# Patient Record
Sex: FEMALE | Race: WHITE | URBAN - METROPOLITAN AREA
[De-identification: names, ages, dates, MRNs, and addresses within clinical notes are randomized per-mention and may not be internally consistent; named-entity substitution may affect disease eponyms.]

---

## 2017-10-03 ENCOUNTER — TELEPHONE (OUTPATIENT)
Dept: DERMATOLOGY CLINIC | Facility: CLINIC | Age: 62
End: 2017-10-03

## 2017-10-03 RX ORDER — LEVOCETIRIZINE DIHYDROCHLORIDE 5 MG/1
5 TABLET, FILM COATED ORAL EVERY EVENING
Qty: 30 TABLET | Refills: 0 | Status: SHIPPED | OUTPATIENT
Start: 2017-10-03

## 2017-10-03 RX ORDER — PREDNISONE 10 MG/1
TABLET ORAL
Qty: 18 TABLET | Refills: 0 | Status: SHIPPED | OUTPATIENT
Start: 2017-10-03 | End: 2021-07-31

## 2017-10-03 RX ORDER — TRIAMCINOLONE ACETONIDE 5 MG/G
CREAM TOPICAL
Qty: 60 G | Refills: 1 | Status: SHIPPED
Start: 2017-10-03 | End: 2017-10-03

## 2017-10-03 RX ORDER — CLOBETASOL PROPIONATE 0.5 MG/G
1 CREAM TOPICAL 2 TIMES DAILY
Qty: 45 G | Refills: 1 | Status: SHIPPED
Start: 2017-10-03 | End: 2017-10-03

## 2017-10-03 RX ORDER — LEVOCETIRIZINE DIHYDROCHLORIDE 5 MG/1
5 TABLET, FILM COATED ORAL EVERY EVENING
Qty: 30 TABLET | Refills: 0 | Status: SHIPPED
Start: 2017-10-03 | End: 2017-10-03

## 2017-10-03 RX ORDER — PREDNISONE 10 MG/1
TABLET ORAL
Qty: 18 TABLET | Refills: 0 | Status: SHIPPED
Start: 2017-10-03 | End: 2017-10-03

## 2017-10-03 RX ORDER — CLOBETASOL PROPIONATE 0.5 MG/G
1 CREAM TOPICAL 2 TIMES DAILY
Qty: 45 G | Refills: 1 | Status: SHIPPED | OUTPATIENT
Start: 2017-10-03 | End: 2018-10-03

## 2017-10-03 RX ORDER — TRIAMCINOLONE ACETONIDE 5 MG/G
CREAM TOPICAL
Qty: 60 G | Refills: 1 | Status: SHIPPED | OUTPATIENT
Start: 2017-10-03

## 2017-10-03 NOTE — TELEPHONE ENCOUNTER
LOV 11/26/16 (please see below Na's note) pt states this started Duy 10/1/17 - she is noticing \"bumps to random spots on her body\" - face, thighs, chest, legs, - describes them as \"raised, itchy, reddish\".  She has been using benadryl orally with la

## 2017-10-03 NOTE — TELEPHONE ENCOUNTER
rx's sent--tac, clobetasol, prednisone and xyzal.  Tac for face clobetasol to other areas.   Prednisone if cont to spread, xyzal less drowsy generally than benadryl, but still can cause drowsiness so just take at night

## 2017-10-04 ENCOUNTER — TELEPHONE (OUTPATIENT)
Dept: DERMATOLOGY CLINIC | Facility: CLINIC | Age: 62
End: 2017-10-04

## 2017-10-04 NOTE — TELEPHONE ENCOUNTER
Called pt back - our coversation got caught off and I could not get  Through to her back - ended up leaving a msg on identified VM - advised to wash face with soap and water and apply  Vaseline. If she is experiencing any facial/lip/tongue swelling or trouble breathing she should dial 911.

## 2017-10-05 NOTE — TELEPHONE ENCOUNTER
Attempted to call pt- no answer , mail box full [phone] #,   M/l detailed on [phone]#  Pt  Should have prednisone taper, and topical steroids and antihistamine. Not clear which meds she is using at this time.

## 2017-10-07 NOTE — TELEPHONE ENCOUNTER
Unable to leave message on [phone]# (mailbox full), left message to call back on [phone]#. Asked pt to please call office back regarding her urgent telephone call last week. Letter mailed to pt asking that she call the Dermatology Department and give update of her condition and for triage of condition.

## 2017-12-27 ENCOUNTER — OFFICE VISIT (OUTPATIENT)
Dept: DERMATOLOGY CLINIC | Facility: CLINIC | Age: 62
End: 2017-12-27

## 2017-12-27 DIAGNOSIS — D23.60 BENIGN NEOPLASM OF SKIN OF UPPER LIMB, INCLUDING SHOULDER, UNSPECIFIED LATERALITY: ICD-10-CM

## 2017-12-27 DIAGNOSIS — L82.1 SEBORRHEIC KERATOSES: Primary | ICD-10-CM

## 2017-12-27 DIAGNOSIS — D23.30 BENIGN NEOPLASM OF SKIN OF FACE: ICD-10-CM

## 2017-12-27 DIAGNOSIS — L30.9 DERMATITIS: ICD-10-CM

## 2017-12-27 DIAGNOSIS — D23.70 BENIGN NEOPLASM OF SKIN OF LOWER LIMB, INCLUDING HIP, UNSPECIFIED LATERALITY: ICD-10-CM

## 2017-12-27 DIAGNOSIS — D23.4 BENIGN NEOPLASM OF SCALP AND SKIN OF NECK: ICD-10-CM

## 2017-12-27 DIAGNOSIS — D23.5 BENIGN NEOPLASM OF SKIN OF TRUNK, EXCEPT SCROTUM: ICD-10-CM

## 2017-12-27 PROCEDURE — 99212 OFFICE O/P EST SF 10 MIN: CPT | Performed by: DERMATOLOGY

## 2017-12-27 PROCEDURE — 99213 OFFICE O/P EST LOW 20 MIN: CPT | Performed by: DERMATOLOGY

## 2018-01-08 NOTE — PROGRESS NOTES
London Vanessa is a 63year old female. HPI:     CC:  Patient presents with:  Full Skin Exam: LOV 11/26/2016. Pt presenting for yearly full body skin exam. Pt denies personal hx of skin cancer. Pt has a family hx of BCC (mother).         Emma Medeiros mouth. Disp:  Rfl:    Clobetasol Propionate 0.05 % External Cream Apply 1 Application topically 2 (two) times daily. Disp: 45 g Rfl: 1   predniSONE 10 MG Oral Tab Take 3 by mouth once daily for 3 days, then 2 for 3 days, then 1 for 3 days.  Disp: 18 tablet Cancer Father      Cancer-throat   • Dementia Maternal Grandmother      Alzheimer's    • Stroke Maternal Grandfather    • Cataracts Other    • Dementia Maternal Aunt      alzheimer's   • Heart Disease Brother    • Heart Disease Paternal Aunt        There w neoplasm of scalp and skin of neck  Benign neoplasm of skin of trunk, except scrotum  Benign neoplasm of skin of upper limb, including shoulder, unspecified laterality  Benign neoplasm of skin of lower limb, including hip, unspecified laterality  Dermatiti

## 2018-02-01 ENCOUNTER — OFFICE VISIT (OUTPATIENT)
Dept: DERMATOLOGY CLINIC | Facility: CLINIC | Age: 63
End: 2018-02-01

## 2018-02-01 DIAGNOSIS — D23.4 BENIGN NEOPLASM OF SCALP AND SKIN OF NECK: ICD-10-CM

## 2018-02-01 DIAGNOSIS — D23.30 BENIGN NEOPLASM OF SKIN OF FACE: ICD-10-CM

## 2018-02-01 DIAGNOSIS — T14.8XXA HEMATOMA: Primary | ICD-10-CM

## 2018-02-01 PROCEDURE — 99212 OFFICE O/P EST SF 10 MIN: CPT | Performed by: DERMATOLOGY

## 2018-02-01 PROCEDURE — 99213 OFFICE O/P EST LOW 20 MIN: CPT | Performed by: DERMATOLOGY

## 2018-02-01 RX ORDER — EPINEPHRINE 0.3 MG/.3ML
0.3 INJECTION SUBCUTANEOUS ONCE
Qty: 2 EACH | Refills: 1 | Status: SHIPPED | OUTPATIENT
Start: 2018-02-01 | End: 2018-02-01

## 2018-02-12 NOTE — PROGRESS NOTES
Brian Gonzales is a 63year old female.     Patient presents with:  Lesion: LOV 12/27/17 pt was seen for full body check, pt here today s/p fall on ice she fell face forward 01/10/18 her front teeth cut her upper lip and has a lump since wanted Never Smoker                                                              Smokeless tobacco: Never Used                      Alcohol use:  Yes              Comment: Wine, 2 drinks occasionally                  Current Outpatient Prescriptions:  Probiotic Pr LIGATION    Social History  Social History   Marital status:   Spouse name: N/A    Years of education: N/A  Number of children: N/A     Occupational History  None on file     Social History Main Topics   Smoking status: Never Smoker    Smokeless tob no suspicious lesions. Healing traumatic injury upper lip. Well-healed some thickening of scar tissue hematoma underneath.   No other new suspicious lesions reassurance regarding keratoses, nevi milia     ASSESSMENT AND PLAN:     Hematoma  (primary encoun

## 2018-11-21 ENCOUNTER — TELEPHONE (OUTPATIENT)
Dept: DERMATOLOGY CLINIC | Facility: CLINIC | Age: 63
End: 2018-11-21

## 2018-11-21 NOTE — TELEPHONE ENCOUNTER
Called pt back - she is requesting to speak with you directly regarding a spot - did not want appt.  Please see tel number below

## 2018-11-21 NOTE — TELEPHONE ENCOUNTER
[phone] non urgent but she is requesting to speak with dr. Jose Mirza personally to save everyones time, she did not specify other than it's about a spot that popped up and dr. Jose Mirza told her she can leave a message to speak directly to dr. Jose Mirza and I told her if dr. Jose Mirza is too busy a nurse would call back to discuss and try to ans. Any questions she is having.  pls advise

## 2018-11-24 NOTE — TELEPHONE ENCOUNTER
recommendations reviewed--sounds like may have been a sk irritated. More eczematous leisons scattered .   Tac and moisturizer--will schedule appt if not improving

## 2019-02-22 ENCOUNTER — OFFICE VISIT (OUTPATIENT)
Dept: DERMATOLOGY CLINIC | Facility: CLINIC | Age: 64
End: 2019-02-22
Payer: COMMERCIAL

## 2019-02-22 DIAGNOSIS — D23.60 BENIGN NEOPLASM OF SKIN OF UPPER LIMB, INCLUDING SHOULDER, UNSPECIFIED LATERALITY: ICD-10-CM

## 2019-02-22 DIAGNOSIS — D23.30 BENIGN NEOPLASM OF SKIN OF FACE: Primary | ICD-10-CM

## 2019-02-22 DIAGNOSIS — D23.70 BENIGN NEOPLASM OF SKIN OF LOWER LIMB, INCLUDING HIP, UNSPECIFIED LATERALITY: ICD-10-CM

## 2019-02-22 DIAGNOSIS — L82.1 SEBORRHEIC KERATOSES: ICD-10-CM

## 2019-02-22 DIAGNOSIS — D23.5 BENIGN NEOPLASM OF SKIN OF TRUNK, EXCEPT SCROTUM: ICD-10-CM

## 2019-02-22 DIAGNOSIS — D23.4 BENIGN NEOPLASM OF SCALP AND SKIN OF NECK: ICD-10-CM

## 2019-02-22 PROCEDURE — 99212 OFFICE O/P EST SF 10 MIN: CPT | Performed by: DERMATOLOGY

## 2019-02-22 PROCEDURE — 99213 OFFICE O/P EST LOW 20 MIN: CPT | Performed by: DERMATOLOGY

## 2019-02-22 RX ORDER — ZOSTER VACCINE RECOMBINANT, ADJUVANTED 50 MCG/0.5
KIT INTRAMUSCULAR
Refills: 0 | COMMUNITY
Start: 2019-01-31

## 2019-02-22 RX ORDER — DIPHENOXYLATE HYDROCHLORIDE AND ATROPINE SULFATE 2.5; .025 MG/1; MG/1
1 TABLET ORAL
COMMUNITY
Start: 2009-06-09

## 2019-02-22 RX ORDER — MELOXICAM 15 MG/1
TABLET ORAL
Refills: 2 | COMMUNITY
Start: 2019-02-14

## 2019-02-22 RX ORDER — CETIRIZINE HYDROCHLORIDE 10 MG/1
10 TABLET ORAL
COMMUNITY
End: 2021-07-31

## 2019-02-22 RX ORDER — ESTRADIOL 0.1 MG/G
1 CREAM VAGINAL
COMMUNITY
Start: 2017-06-06

## 2019-03-04 NOTE — PROGRESS NOTES
Sherryll Buerger is a 66year old female. Patient presents with:  Lesion: LOV 2/1/18. Pt presenting with lesion on left leg for about a month. c/o pain and itching. Mother had BCC, no personal HX. Bee Venom; Sulfa Antibiotics;  Sulf Fibromyalgia    • H/O measles    • H/O mumps    • History of chicken pox    • IBS (irritable bowel syndrome)    • Osteoporosis    • Sinus disorder     Sinus problems      Social History:  Social History    Tobacco Use      Smoking status: Never Smoker Allergies:     Bee Venom               ANAPHYLAXIS  Sulfa Antibiotics       HIVES, SHORTNESS OF BREATH,                            SWELLING    Comment:swelling  Sulfanilamide           SHORTNESS OF BREATH    Past Medical History:   Diagnosis Date   • A Forced sexual activity: Not on file    Other Topics      Concerns:        Grew up on a farm: Not Asked        History of tanning: Not Asked        Outdoor occupation: Not Asked        Pt has a pacemaker: No        Pt has a defibrillator: No        [facility] performed, including scalp, head, neck, face,nails, hair, external eyes, including conjunctival mucosa, eyelids, lips, external ears, back, chest, abdomen, arms, legs, palms. Remarkable for lesions as noted   Healing otherwise no suspicious lesions.   Heal hour(s)). Meds This Visit:      Imaging Orders:  None     Referral Orders: The patient indicates understanding of these issues and agrees to the plan. The patient is asked to return as noted in follow-up/ above.     This note was generated using Dragon

## 2019-04-09 ENCOUNTER — TELEPHONE (OUTPATIENT)
Dept: DERMATOLOGY CLINIC | Facility: CLINIC | Age: 64
End: 2019-04-09

## 2019-04-10 NOTE — TELEPHONE ENCOUNTER
Pt requesting closer Gyne than at [facility] group vs new group at [facility] Dr. Lino GOLD and assoc at [address] [place] [address]

## 2019-06-08 ENCOUNTER — OFFICE VISIT (OUTPATIENT)
Dept: DERMATOLOGY CLINIC | Facility: CLINIC | Age: 64
End: 2019-06-08
Payer: COMMERCIAL

## 2019-06-08 DIAGNOSIS — L25.9 CONTACT DERMATITIS AND ECZEMA: ICD-10-CM

## 2019-06-08 DIAGNOSIS — L82.1 SEBORRHEIC KERATOSES: ICD-10-CM

## 2019-06-08 DIAGNOSIS — D48.5 NEOPLASM OF UNCERTAIN BEHAVIOR OF SKIN: Primary | ICD-10-CM

## 2019-06-08 DIAGNOSIS — D23.9 BENIGN NEOPLASM OF SKIN, UNSPECIFIED LOCATION: ICD-10-CM

## 2019-06-08 PROCEDURE — 11102 TANGNTL BX SKIN SINGLE LES: CPT | Performed by: DERMATOLOGY

## 2019-06-08 PROCEDURE — 88305 TISSUE EXAM BY PATHOLOGIST: CPT | Performed by: DERMATOLOGY

## 2019-06-08 PROCEDURE — 99213 OFFICE O/P EST LOW 20 MIN: CPT | Performed by: DERMATOLOGY

## 2019-06-08 RX ORDER — UBIDECARENONE 75 MG
250 CAPSULE ORAL DAILY
COMMUNITY

## 2019-06-08 RX ORDER — BIOTIN 1 MG
1 TABLET ORAL DAILY
COMMUNITY

## 2019-06-08 RX ORDER — CLOBETASOL PROPIONATE 0.46 MG/ML
1 SOLUTION TOPICAL 2 TIMES DAILY
Qty: 50 ML | Refills: 6 | Status: SHIPPED | OUTPATIENT
Start: 2019-06-08 | End: 2021-07-31

## 2019-06-10 ENCOUNTER — TELEPHONE (OUTPATIENT)
Dept: DERMATOLOGY CLINIC | Facility: CLINIC | Age: 64
End: 2019-06-10

## 2019-06-10 DIAGNOSIS — S00.06XS INSECT BITE OF SCALP, SEQUELA: ICD-10-CM

## 2019-06-10 DIAGNOSIS — R21 RASH AND OTHER NONSPECIFIC SKIN ERUPTION: Primary | ICD-10-CM
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
## 2019-06-10 RX ORDER — PREDNISONE 10 MG/1
TABLET ORAL
Qty: 30 TABLET | Refills: 0 | Status: SHIPPED | OUTPATIENT
Start: 2019-06-10 | End: 2019-06-22

## 2019-06-11 NOTE — TELEPHONE ENCOUNTER
Spoke with pt. States  is picking up rx now. Using TAC cream as advised. Will notify clinic if her symptoms do not improve.

## 2019-06-11 NOTE — TELEPHONE ENCOUNTER
Call from patient rash noted at visit spreading. This looked like poison ivy at visit , but only few spots. Still suspect plant contact dermatitis. Rx for prednisone taper as noted, re-send to another pharmacy please if necessary.   Please let me know if

## 2019-06-13 NOTE — PROGRESS NOTES
The pathology report from last visit showed   right lower medial leg, shave biopsy:  -Dermatofibroma. P lease log in test results, send biopsy results letter. Pt to rtc 1 year or prn.

## 2019-06-17 NOTE — PROGRESS NOTES
Mario Ac is a 66year old female.   HPI:     CC:  Patient presents with:  Lesion: LOV 02/22/19 pt seen for lesion on her left leg, pt here now for skin tags on her right side neck, a lesion on her right calf that was burned off at last visi triamcinolone acetonide 0.1 % External Cream Use bid as directed Disp: 454 g Rfl: 3   cetirizine 10 MG Oral Tab Take 10 mg by mouth. Disp:  Rfl:    Estradiol 0.1 MG/GM Vaginal Cream Place 1 g vaginally.  Disp:  Rfl:    SHINGRIX 50 MCG/0.5ML Intramuscular HIVES, SHORTNESS OF BREATH,                            SWELLING    Comment:swelling  Sulfanilamide           SHORTNESS OF BREATH    Past Medical History:   Diagnosis Date   • Atypical lobular hyperplasia of left breast 2015   • Esophageal reflux    • Fi Grew up on a farm: Not Asked        History of tanning: Not Asked        Outdoor occupation: Not Asked        Pt has a pacemaker: No        Pt has a defibrillator: No        Breast feeding: Not Asked        Reaction to local anesthetic: No         lower frequently traumatized with shaving irritating on right calf  Lesions inflamed getting caught on clothing jewelry on neck painful  Patient has been in their usual state of health. History, medications, allergies reviewed as noted.       ROS:  Denies with shaving, tender at times  Shave/ tangential biopsy performed, operative note and consent in chart further plans pending pathology    Skin tags at lateral posterior neck irritated reassurance given cautery.     Nail changes has been using formula 7 for

## 2019-06-17 NOTE — PROGRESS NOTES
Operative Report                     Shave/  Tangential biopsy     Clinical diagnosis:    Size of lesion:    Location:Diagnosis/Clinical History: pt with nodule, irritated bleeds frequently, growing  Spec 1 Description >>>>>: right lower medial leg

## 2019-06-18 NOTE — TELEPHONE ENCOUNTER
Pt asking if she should be tested for Lyme Disease,thinks maybe a tick ? Was seen on 6/8. Pt finishing up Prednisone. Speckles still going up her arm. Did go to [address] and was told eyes loaded with allergies.   Please call pt

## 2019-06-19 NOTE — TELEPHONE ENCOUNTER
S/w pt. Phone updated. Pt seen 6/8/19, states her rash is \"90% better, much faded\", but she wanders if she should be tested for Lyme disease anyway. Pt sounds anxious and wants to take precaution.  She is also asking if there is a diagnosis for her bald s

## 2019-06-20 NOTE — TELEPHONE ENCOUNTER
Pt informed of diagnosis. She would like to proceed with Lyme titer. Order pended. Please review and sign.      [facility]-Please mail lab requisition to   C/Nico Talley  [place] [address]

## 2019-06-20 NOTE — TELEPHONE ENCOUNTER
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 Can order Lyme titer.   Does she want order mailed or at [facility] or [address] [place] [address]?

## 2019-08-26 ENCOUNTER — TELEPHONE (OUTPATIENT)
Dept: DERMATOLOGY CLINIC | Facility: CLINIC | Age: 64
End: 2019-08-26

## 2019-08-26 NOTE — TELEPHONE ENCOUNTER
Pt has a growth on leg that keeps returning even after KMT removes, would like to see if there is anythng else to be done. . Scheduled oct.  12 but would like to know if anything sooner if possible

## 2019-08-26 NOTE — TELEPHONE ENCOUNTER
Pt with previously biopsioed dermatofibroma to right leg \"that keeps growing back\". Pt asking if there is any other treatment we may recommend?  She did make an jonathon for F/U on this

## 2019-08-27 NOTE — TELEPHONE ENCOUNTER
Excision with plastics--suggest  Dr. Sarah Jeffries or Dr. Lucia Whiteside or [facility] [place] recurrent DF.

## 2019-09-05 NOTE — TELEPHONE ENCOUNTER
Pt states Dr. Mabel Palacio is out of office until October. Is it okay to wait until then?  Please call

## 2019-11-26 ENCOUNTER — TELEPHONE (OUTPATIENT)
Dept: DERMATOLOGY CLINIC | Facility: CLINIC | Age: 64
End: 2019-11-26

## 2019-11-26 NOTE — TELEPHONE ENCOUNTER
In box for review. Patient saw Dr. Jody Larios 11/25/19 and she is considering options for removal of dermatofibroma to right leg    Scanning sheet attached.

## 2019-12-09 NOTE — TELEPHONE ENCOUNTER
PLEASE FAX TO [facility] SKIN CANCER-DR Kvng Mendez- PATH RESULTS FROM [facility]  FAX -[phone]    ALSO PT STATES THEY DO NOT DO LASER AT THAT OFFICE. IS IT OKAY TO STILL SEE THIS DOCTOR?  PLEASE ADVISE

## 2019-12-10 NOTE — TELEPHONE ENCOUNTER
Patient saw Dr. Abelardo Guzman 11/25/19 and he recommended using TAC BID to area. Dr. Abelardo Guzman did not want to excise lesion? He thought that the TAC would help resolve this area. Now area is breaking open. Scaly and flaking at times.  Per patient, \"red bump about

## 2019-12-10 NOTE — TELEPHONE ENCOUNTER
Laser might treat the surface blood vessel changes/ redness. If it is not better with the tac, then it most likely will need surgery to remove the entire dermatofibroma. I already did a fairly deep shve and it came back.     Excising this is the only way t

## 2019-12-10 NOTE — TELEPHONE ENCOUNTER
Ok to fax, if she would like to pursue laser- then Dr. Guerline Rodriguez would be the laser surgeon ( he is down the street from Dr. Jayleen Lao)

## 2019-12-23 ENCOUNTER — TELEPHONE (OUTPATIENT)
Dept: DERMATOLOGY CLINIC | Facility: CLINIC | Age: 64
End: 2019-12-23

## 2019-12-24 NOTE — TELEPHONE ENCOUNTER
Patient contacted  regarding another issue.   Please fax path from 6-8-19 to Dr. Jose Ramon Jama ( [address])  Thanks [facility]

## 2020-01-30 ENCOUNTER — TELEPHONE (OUTPATIENT)
Dept: DERMATOLOGY CLINIC | Facility: CLINIC | Age: 65
End: 2020-01-30

## 2020-01-30 NOTE — TELEPHONE ENCOUNTER
Spoke with patient. verified name and . patient states she has a bald spot towards the crown of her head and would like recommendations on what she may use to help hair grow.      Informed patient will forward this message to Dr. Bjorn Ga for further Finas Goldie

## 2020-05-30 ENCOUNTER — OFFICE VISIT (OUTPATIENT)
Dept: DERMATOLOGY CLINIC | Facility: CLINIC | Age: 65
End: 2020-05-30
Payer: COMMERCIAL

## 2020-05-30 DIAGNOSIS — D23.5 BENIGN NEOPLASM OF SKIN OF TRUNK, EXCEPT SCROTUM: ICD-10-CM

## 2020-05-30 DIAGNOSIS — L82.1 SEBORRHEIC KERATOSES: Primary | ICD-10-CM

## 2020-05-30 DIAGNOSIS — D23.70 BENIGN NEOPLASM OF SKIN OF LOWER LIMB, INCLUDING HIP, UNSPECIFIED LATERALITY: ICD-10-CM

## 2020-05-30 DIAGNOSIS — D23.30 BENIGN NEOPLASM OF SKIN OF FACE: ICD-10-CM

## 2020-05-30 DIAGNOSIS — D23.60 BENIGN NEOPLASM OF SKIN OF UPPER LIMB, INCLUDING SHOULDER, UNSPECIFIED LATERALITY: ICD-10-CM

## 2020-05-30 DIAGNOSIS — D23.9 BENIGN NEOPLASM OF SKIN, UNSPECIFIED LOCATION: ICD-10-CM

## 2020-05-30 DIAGNOSIS — D23.4 BENIGN NEOPLASM OF SCALP AND SKIN OF NECK: ICD-10-CM

## 2020-05-30 PROCEDURE — 99213 OFFICE O/P EST LOW 20 MIN: CPT | Performed by: DERMATOLOGY

## 2020-06-01 NOTE — PROGRESS NOTES
Jina Hdz is a 60year old female.   HPI:     CC:  Patient presents with:  Full Skin Exam: LOV 06/08/19 pt here for full body check has some new dry spots on her skin that she would like looked at pt has been manuela wagner as instructed but h Oral Tab Take 10 mg by mouth. • Estradiol 0.1 MG/GM Vaginal Cream Place 1 g vaginally. • Meloxicam 15 MG Oral Tab TK 1 T PO QD  2   • Multiple Vitamin (THERA/BETA-CAROTENE) Oral Tab Take 1 tablet by mouth.      • SHINGRIX 50 MCG/0.5ML Intramuscular • Sinus disorder     Sinus problems     Past Surgical History:   Procedure Laterality Date   •   , 3/96   • TUBAL LIGATION       Social History    Socioeconomic History      Marital status:       Spouse name: Not on file      Nu Occupational Exposure: Not Asked        Hobby Hazards: Not Asked        Sleep Concern: Not Asked        Stress Concern: Not Asked        Weight Concern: Not Asked        Special Diet: Not Asked        Back Care: Not Asked        Exercise: Not Asked Well-developed well-nourished patient alert oriented in no acute distress. Exam performed, including scalp, head, neck, face,nails, hair, external eyes, including conjunctival mucosa, eyelids, lips external ears , arms, digits,palms.      Multiple light to twice daily. Status post excision of dermatofibroma healing well right leg    Scattered benign keratoses reassurance no other suspicious lesions    Waxy tan papule vertex. Slight decreased density noted at vertex.   Discussed possible early pattern loss

## 2020-06-05 ENCOUNTER — TELEPHONE (OUTPATIENT)
Dept: DERMATOLOGY CLINIC | Facility: CLINIC | Age: 65
End: 2020-06-05

## 2020-06-05 NOTE — TELEPHONE ENCOUNTER
Called and Spoke with Maria Isabel Brandt 5/30/20. John Bonillajersey is not sure if she should continue to use Rogaine, c/o redness to scalp, If she should continue, how long and would this be long term.  Please Advise

## 2020-06-05 NOTE — TELEPHONE ENCOUNTER
Discussed with pt, yes ok to use the rogaine when not red, may try tac--has at home when red-more consistently 2-3x per week at the anterior patch. Overall improved at lov vs prior. Smitha Agee.   Will let us know if any issues when her father-in -law come

## 2020-07-03 ENCOUNTER — TELEPHONE (OUTPATIENT)
Dept: DERMATOLOGY CLINIC | Facility: CLINIC | Age: 65
End: 2020-07-03

## 2020-07-03 NOTE — TELEPHONE ENCOUNTER
Redness and irritation from minoxidil excoriated inflamed papules at crown.   We will add minoxidil message received from patient regarding irritation on scalp    Hold minoxidil they may have been mupirocin and triamcinolone at home may use on the inflamed

## 2020-10-16 ENCOUNTER — OFFICE VISIT (OUTPATIENT)
Dept: DERMATOLOGY CLINIC | Facility: CLINIC | Age: 65
End: 2020-10-16
Payer: COMMERCIAL

## 2020-10-16 DIAGNOSIS — L65.9 ALOPECIA: Primary | ICD-10-CM

## 2020-10-16 PROCEDURE — 99212 OFFICE O/P EST SF 10 MIN: CPT | Performed by: DERMATOLOGY

## 2020-10-16 RX ORDER — SELENIUM 50 MCG
1 TABLET ORAL 2 TIMES DAILY
COMMUNITY

## 2020-10-16 RX ORDER — CLOBETASOL PROPIONATE 0.46 MG/ML
1 SOLUTION TOPICAL 2 TIMES DAILY
COMMUNITY
Start: 2020-10-02 | End: 2021-07-31

## 2020-10-16 RX ORDER — DOXYCYCLINE 100 MG/1
TABLET ORAL
COMMUNITY
Start: 2020-10-09 | End: 2021-07-31

## 2020-10-16 RX ORDER — CETIRIZINE HYDROCHLORIDE 10 MG/1
1 TABLET ORAL DAILY
COMMUNITY
End: 2021-07-31

## 2020-10-16 RX ORDER — EPINEPHRINE 0.3 MG/.3ML
INJECTION SUBCUTANEOUS
COMMUNITY
Start: 2020-10-02

## 2020-10-16 RX ORDER — EPINEPHRINE 0.3 MG/.3ML
0.3 INJECTION SUBCUTANEOUS
COMMUNITY
Start: 2019-11-26

## 2020-10-16 RX ORDER — LACTOBACILLUS ACIDOPHILUS
2 POWDER (GRAM) MISCELLANEOUS 2 TIMES DAILY
COMMUNITY
End: 2021-07-31

## 2020-10-16 RX ORDER — GLUCOSAMINE/CHONDR SU A SOD 750-600 MG
1 TABLET ORAL 2 TIMES DAILY
COMMUNITY

## 2020-10-16 RX ORDER — RALOXIFENE HYDROCHLORIDE 60 MG/1
TABLET, FILM COATED ORAL
COMMUNITY
Start: 2020-08-15

## 2020-10-16 RX ORDER — FAMOTIDINE 40 MG/1
40 TABLET, FILM COATED ORAL 2 TIMES DAILY
COMMUNITY
Start: 2019-11-26 | End: 2021-07-31

## 2020-10-16 RX ORDER — LUTEIN 10 MG
1 TABLET ORAL 2 TIMES DAILY
COMMUNITY
End: 2021-07-31

## 2020-10-16 RX ORDER — ESOMEPRAZOLE MAGNESIUM 40 MG/1
44 CAPSULE, DELAYED RELEASE ORAL 2 TIMES DAILY
COMMUNITY
End: 2021-07-31

## 2020-10-16 RX ORDER — DOXYCYCLINE HYCLATE 100 MG
100 TABLET ORAL 2 TIMES DAILY
COMMUNITY
Start: 2020-10-12 | End: 2021-01-10

## 2020-10-25 NOTE — PROGRESS NOTES
Tyree Marinelli is a 63year old female.   HPI:     CC:  Patient presents with:  Derm Problem: LOV 5/30/20 patient states she had a punch procedure at [facility] and needs sutures removed, patient  is alos here for hair loss, has been having hair times daily. • Lutein-Zeaxanthin 15-0.7 MG Oral Cap Take 1 capsule by mouth 2 (two) times daily. • Lactobacillus (ACIDOPHILUS) Oral Cap Take 1 tablet by mouth 2 (two) times daily.      • Calcium Carb-Cholecalciferol 600-1000 MG-UNIT Oral Tab Take 60 EPINEPHrine 0.3 MG/0.3ML Injection Solution Auto-injector      • Lutein-Zeaxanthin 25-5 MG Oral Cap Take 1 tablet by mouth 2 (two) times daily.      • Raloxifene HCl 60 MG Oral Tab TAKE 1 TABLET BY MOUTH EVERY DAY     • Tolnaftate 1 % External Solution Appl mumps    • History of chicken pox    • IBS (irritable bowel syndrome)    • Osteoporosis    • Sinus disorder     Sinus problems     Past Surgical History:   Procedure Laterality Date   •   , 3/96   • TUBAL LIGATION       Social History Not Asked        Caffeine Concern: Yes          Coffee, soda, 2 cups daily        Occupational Exposure: Not Asked        Hobby Hazards: Not Asked        Sleep Concern: Not Asked        Stress Concern: Not Asked        Weight Concern: Not Asked        Spec pleased with outcome    Patient has been in their usual state of health. History, medications, allergies reviewed as noted. ROS:  Denies any other systemic complaints. No new or changeing lesions other than noted above.  No fevers, chills, night swea tolerated    bx DIAGNOSIS  A and B.  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 Rohan Batista

## 2021-02-23 ENCOUNTER — TELEPHONE (OUTPATIENT)
Dept: DERMATOLOGY CLINIC | Facility: CLINIC | Age: 66
End: 2021-02-23

## 2021-02-23 NOTE — TELEPHONE ENCOUNTER
Unable to locate Alopecia Dx prior to pt's office visit in October 2020. Previous TE's with pt asking for advise on hair loss issues. Please advise. Thank you.

## 2021-02-23 NOTE — TELEPHONE ENCOUNTER
Pt 1st question regarding  A spot on scalp , sk, 6/2019, briefly addressed patch at visit 5/2020 but not primary diagnosis

## 2021-02-23 NOTE — TELEPHONE ENCOUNTER
Patients spouse called    Asking if there is a diagnosis between 9/15/19 and 9/14/20 of Alopecia. This is for [employer].  Please call

## 2021-04-27 ENCOUNTER — TELEPHONE (OUTPATIENT)
Dept: DERMATOLOGY CLINIC | Facility: CLINIC | Age: 66
End: 2021-04-27

## 2021-04-27 ENCOUNTER — MED REC SCAN ONLY (OUTPATIENT)
Dept: DERMATOLOGY CLINIC | Facility: CLINIC | Age: 66
End: 2021-04-27

## 2021-04-28 NOTE — TELEPHONE ENCOUNTER
S/w pt to inquire about this - letter to be filled out by us asking if pt had a dx for alopecia or any treatments - pt states this was supposed to be addressed by us already - pt states she never had alopecia nor received any treatments - please confirm th

## 2021-05-01 NOTE — TELEPHONE ENCOUNTER
There was a phone call from pt previously. See last TE Please review dates info requested for.   There were phone calls, but pt came in for sr from treatment at [facility]

## 2021-05-03 NOTE — TELEPHONE ENCOUNTER
Letter from [employer] partially filled out. Given to [facility] for review.

## 2021-05-04 NOTE — TELEPHONE ENCOUNTER
Noted, new letter received 5/3/21 also. Visit dx codes as requested by insurance provided. No rx meds from visit.

## 2021-05-11 ENCOUNTER — MED REC SCAN ONLY (OUTPATIENT)
Dept: DERMATOLOGY CLINIC | Facility: CLINIC | Age: 66
End: 2021-05-11

## 2021-05-24 ENCOUNTER — TELEPHONE (OUTPATIENT)
Dept: DERMATOLOGY CLINIC | Facility: CLINIC | Age: 66
End: 2021-05-24

## 2021-06-18 NOTE — TELEPHONE ENCOUNTER
is asking for a refill on metronidizole (metrogel) cream to be sent to [address], [address] [place] [address] AT [address] [place] [address] [facility], [phone], [phone]

## 2021-06-21 NOTE — TELEPHONE ENCOUNTER
S/w pt's spouse José Luis Parra - he states the medication is for [employer] and that it might have been RXed few years ago - I called [facility] to see if it was a gel or cream and they had no record of it on file either. José Luis Mckayetienne has metronidazole gel 0.75% in his file.  I pende

## 2021-06-22 RX ORDER — METRONIDAZOLE 7.5 MG/G
GEL TOPICAL
Status: CANCELLED | OUTPATIENT
Start: 2021-06-22

## 2021-07-09 ENCOUNTER — TELEPHONE (OUTPATIENT)
Dept: DERMATOLOGY CLINIC | Facility: CLINIC | Age: 66
End: 2021-07-09

## 2021-07-09 NOTE — TELEPHONE ENCOUNTER
Patients spouse Burke Vann called    Asking to speak to RN about the insurance company still calling the office regarding claims.  Please call-

## 2021-07-09 NOTE — TELEPHONE ENCOUNTER
Spoke with patient's ,Sina. Verified patient's name and .  is asking if our office has received any more forms or questions from the insurance company.  Informed  our office has not received any forms or questions from insurance [email or web address]

## 2021-07-31 ENCOUNTER — OFFICE VISIT (OUTPATIENT)
Dept: DERMATOLOGY CLINIC | Facility: CLINIC | Age: 66
End: 2021-07-31
Payer: COMMERCIAL

## 2021-07-31 DIAGNOSIS — L50.0 ALLERGIC URTICARIA: Primary | ICD-10-CM

## 2021-07-31 PROCEDURE — 99213 OFFICE O/P EST LOW 20 MIN: CPT | Performed by: DERMATOLOGY

## 2021-07-31 RX ORDER — EPINEPHRINE 0.3 MG/.3ML
0.3 INJECTION SUBCUTANEOUS ONCE
Qty: 2 EACH | Refills: 2 | Status: SHIPPED | OUTPATIENT
Start: 2021-07-31 | End: 2021-07-31

## 2021-08-01 NOTE — PROGRESS NOTES
[address] [facility]  [facility] [address] ,[address], [address]  Phone: [phone]  Fax: [phone] SUMMARY  Patient Name: Sandralee Favre : 1960   Treatment/Medical Diagnosis: Right ankle pain [M25.571]  Avulsion fracture of right ankle [S82.891A]   Referral Source: Theron Solorzaon MD     Date of Initial Visit: 17 Attended Visits: 11 Missed Visits: 0     SUMMARY OF TREATMENT  Therapeutic exercise including ROM, stretching, gentle strengthening, gait & balance training, postural ed, instruction on walk to jog program, gentle plyometrics, patient education, HEP instruction, CP. CURRENT STATUS  Ms. Althea Gutierrez has made good progress with PT & reports no c/o pain in R ankle, she has transitioned back to her recreational fitness program at [address] [place] [address] is now able to jog up to 1.5 miles on a TM with no c/o pain or evidence of instability. Active & PROM of R ankle pain-free with the exception of very mild end range pain with passive ankle IV. Please see below for other improvements with PT, pt to be discharged to [facility] having met all goals. Goal/Measure of Progress Goal Met? 1. Patient will initiate walk to jog program without exacerbation of sx in R ankle. Status at last Eval: unable Current Status: Pt able to jog up to 1.5 miles yes   2. Improve FOTO score from 48 points to > or = 70 points indicating improved tolerance with ADLs in regards to R knee. Status at last Eval: 48 Current Status: 83 yes   3. Improve overall strength of R ankle to 5-/5 with no c/o pain upon MMT so strength is available for return to pain-free ambulation. Status at last Eval: 4/5 Current Status: R ankle 5/5 strength, pain-free yes   4. Patient to be independent & compliant with HEP in preparation for D/C. Status at last Eval: HEP established Current Status: indep with HEP yes     RECOMMENDATIONS  Discontinue therapy. Shoaib Bravo is a 61year old female. HPI:     CC:  Patient presents with:  Rash: LOV 10/16/2020. Pt presents with pink \"bumps and welts\" to face. Pruritic. Onset 1.5 weeks.  Suspected bug bite but now unsure because lesions are not resolvin Lutein-Zeaxanthin 15-0.7 MG Oral Cap Take 1 capsule by mouth 2 (two) times daily. • Lactobacillus (ACIDOPHILUS) Oral Cap Take 1 tablet by mouth 2 (two) times daily.      • PEG 3350-KCl-NaBcb-NaCl-NaSulf 236 g Oral Recon Soln Begin drinking first portion Progressing towards or have reached established goals. If you have any questions/comments please contact us directly at [phone]. Thank you for allowing us to assist in the care of your patient.     Therapist Signature: SUNDAY Burk, cert MDT Date: 7-80-03     Time: 2:18 PM Reported on 10/16/2020 ) 454 g 3   • SHINGRIX 50 MCG/0.5ML Intramuscular Recon Susp ADM 0.5ML IM UTD (Patient not taking: Reported on 7/31/2021)  0   • Raloxifene HCl 60 MG Oral Tab Take 60 mg by mouth daily. • EPINEPHRINE IJ Inject  as directed. Special Diet: Not Asked        Back Care: Not Asked        Exercise: Not Asked        Bike Helmet: Not Asked        Seat Belt: Not Asked        Self-Exams: Not Asked    Social History Narrative      Not on file    Social Determinants of Health  Financial R improvement. Past notes/ records and appropriate/relevant lab results including pathology and past body maps reviewed. Updated and new information noted in current visit.      As noted red bumpy rash came up very suddenly with facial swelling redness ir remarkable for lesions as noted on map.       Assessment / plan:    Orders Placed This Encounter      Allergy Region 8      Meds & Refills for this Visit:  Requested Prescriptions     Signed Prescriptions Disp Refills   • EPINEPHrine (EPIPEN 2-ELIZABETH) 0.3 MG/0 leg    Scattered benign keratoses reassurance no other suspicious lesions    Waxy tan papule vertex. Slight decreased density noted at vertex. Discussed possible early pattern loss.   Patient's mother with history of lichen planopilaris no evidence of inf

## 2021-08-23 ENCOUNTER — TELEPHONE (OUTPATIENT)
Dept: DERMATOLOGY CLINIC | Facility: CLINIC | Age: 66
End: 2021-08-23

## 2021-08-23 NOTE — TELEPHONE ENCOUNTER
Call from spouse     Asking to have allergy blood test to be faxed to [facility] [place]    Appointment at [facility] scheduled for 8/24 @ 7:30am    Please fax to [phone]

## 2021-08-25 LAB
(T11) IGE: <0.1 KU/L
(T8) IGE: <0.1 KU/L
ALTERNARIA ALTERNATA (M6) IGE: <0.1 KU/L
ASPERGILLUS FUMIGATUS (M3) IGE: <0.1 KU/L
BERMUDA GRASS (G2) IGE: <0.1 KU/L
CAT DANDER (E1) IGE: <0.1 KU/L
CLADOSPORIUM HERBARUM (M2) IGE: <0.1 KU/L
CLASS: 0
COCKROACH (I6) IGE: <0.1 KU/L
COMMON RAGWEED (SHORT)$(W1) IGE: <0.1 KU/L
COTTONWOOD (T14) IGE: <0.1 KU/L
DERMATOPHAGOIDES FARINAE (D2) IGE: <0.1 KU/L
DERMATOPHAGOIDES PTERONYSSINUS (D1) IGE: <0.1 KU/L
DOG DANDER (E5) IGE: <0.1 KU/L
HICKORY/PECAN TREE (T22)$IGE: <0.1 KU/L
IMMUNOGLOBULIN E: 11 KU/L
MAPLE (BOX ELDER) (T1)$IGE: <0.1 KU/L
MOUNTAIN CEDAR (T6) IGE: <0.1 KU/L
MOUSE URINE PROTEINS (E72) IGE: <0.1 KU/L
OAK (T7) IGE: <0.1 KU/L
PENICILLIUM NOTATUM (M1) IGE: <0.1 KU/L
ROUGH MARSH ELDER (W16)$IGE: <0.1 KU/L
ROUGH PIGWEED (W14) IGE: <0.1 KU/L
RUSSIAN THISTLE (W11) IGE: <0.1 KU/L
TIMOTHY GRASS (G6) IGE: <0.1 KU/L
WALNUT TREE (T10) IGE: <0.1 KU/L
WHITE ASH (T15) IGE: <0.1 KU/L
WHITE MULBERRY (T70) IGE: <0.1 KU/L

## 2021-08-25 NOTE — TELEPHONE ENCOUNTER
I called [facility] at number provided - they kofi the blood for allergy panel region 8 (as indicated on the order) they had no further questions, Antonia Luque (spouse) updated

## 2021-08-25 NOTE — TELEPHONE ENCOUNTER
Patients spouse called    Asking to have office call [facility] to clarify order. Had labs drawn yesterday morning at 7:30am. States there was a bit of confusion at the lab about what the order was for.      [address]

## 2021-08-26 ENCOUNTER — MED REC SCAN ONLY (OUTPATIENT)
Dept: DERMATOLOGY CLINIC | Facility: CLINIC | Age: 66
End: 2021-08-26

## 2021-08-26 ENCOUNTER — TELEPHONE (OUTPATIENT)
Dept: DERMATOLOGY CLINIC | Facility: CLINIC | Age: 66
End: 2021-08-26

## 2021-08-26 NOTE — TELEPHONE ENCOUNTER
Fax from [facility] in [facility] 45 in office for review

## 2021-08-31 NOTE — PROGRESS NOTES
Environmental allergy panel is negative. No need for further allergy testing. Contact allergies to plants can still occur, usually this is diagnosed through history. Other contact allergies can be diagnosed via patch testing.   This helps determine other

## 2021-08-31 NOTE — PROGRESS NOTES
Patient informed of test results and all KMT's recommendations. Voiced understanding.  Pt also states that she noticed a lump inside her nostril - wanted to know if this is something she should see derm for or another specialist - advised that she'll need t

## 2021-11-23 NOTE — TELEPHONE ENCOUNTER
*through to pt Calvin at  Pre-op received cardiac clearance, but would like the stress test results as well.    Contact information:  Calvin  Tel: [phone]  Fax: [phone]   Chitra, surgery scheduler from [facility] would like to know if patient is cleared for surgery. The clearance was pending due to the stress test, which is complete.   Surgeon is waiting to know if patient cleared:    Chitra waiting for a call back:  Tel: [phone]   Clearance faxed to presurgery and [facility]   Devi At [facility] left voice mail that they are waiting for the cardiac clearance after patient was to have stress test. Procedure is tomorrow, 11/24/21.  If patient is not cleared please call ASAP so that they cancel the procedure.    Please call Devi to advise:    Tel: [phone]  Fax: [phone]     [facility] Pre-op requesting if we can fax the clearance and results of EKG for patients procedure scheduled on 11/24/21    L[facility]ette Alfonso Pre-op  Tel: [phone]  Fax: [phone]       no

## 2021-12-27 ENCOUNTER — OFFICE VISIT (OUTPATIENT)
Dept: DERMATOLOGY CLINIC | Facility: CLINIC | Age: 66
End: 2021-12-27
Payer: COMMERCIAL

## 2021-12-27 DIAGNOSIS — D23.5 BENIGN NEOPLASM OF SKIN OF TRUNK, EXCEPT SCROTUM: ICD-10-CM

## 2021-12-27 DIAGNOSIS — D23.4 BENIGN NEOPLASM OF SCALP AND SKIN OF NECK: ICD-10-CM

## 2021-12-27 DIAGNOSIS — D23.30 BENIGN NEOPLASM OF SKIN OF FACE: ICD-10-CM

## 2021-12-27 DIAGNOSIS — L82.1 SEBORRHEIC KERATOSES: Primary | ICD-10-CM

## 2021-12-27 DIAGNOSIS — D23.60 BENIGN NEOPLASM OF SKIN OF UPPER LIMB, INCLUDING SHOULDER, UNSPECIFIED LATERALITY: ICD-10-CM

## 2021-12-27 PROCEDURE — 99213 OFFICE O/P EST LOW 20 MIN: CPT | Performed by: DERMATOLOGY

## 2021-12-27 RX ORDER — ESOMEPRAZOLE MAGNESIUM 40 MG/1
44 FOR SUSPENSION ORAL 2 TIMES DAILY
COMMUNITY

## 2022-01-10 NOTE — PROGRESS NOTES
Mau Hodges is a 61year old female. HPI:     CC:  Patient presents with:  Upper Body Exam: LOV 7/31/21. pt presenting today with upper body skin check. pt has lesions of concern to L arm, R shoulder, L ear and back for 4 monhts.  pt states l Cap Take 1 capsule by mouth 2 (two) times daily. • Lactobacillus (ACIDOPHILUS) Oral Cap Take 1 tablet by mouth 2 (two) times daily. • Calcium Carb-Cholecalciferol 600-1000 MG-UNIT Oral Tab Take 600 mg by mouth 2 (two) times daily.      • Cholecalcif Application topically.  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   Has been taking collagen powder which seems to be helping not using any topicals    Status post excision with Dr. Anna Jay given.    Irritated keratosis versus fibroma at left lateral upper arm observe. Cystic nodule inflamed at left lateral conchal bowl suggest Neosporin nightly. No suspicious lesions noted.     Recurrent dermatitis continue EpiPen Zyrtec's Benadryl triamc of skin cancer, ABCDE's of melanoma discussed with patient. Sunscreen use, sun protection, self exams reviewed. Followup as noted RTC ---routine checkup    6 mos -one year or p.r.n.     The patient indicates understanding of these issues and agrees to the

## 2022-04-16 ENCOUNTER — OFFICE VISIT (OUTPATIENT)
Dept: DERMATOLOGY CLINIC | Facility: CLINIC | Age: 67
End: 2022-04-16
Payer: COMMERCIAL

## 2022-04-16 DIAGNOSIS — D23.60 BENIGN NEOPLASM OF SKIN OF UPPER LIMB, INCLUDING SHOULDER, UNSPECIFIED LATERALITY: ICD-10-CM

## 2022-04-16 DIAGNOSIS — D23.4 BENIGN NEOPLASM OF SCALP AND SKIN OF NECK: ICD-10-CM

## 2022-04-16 DIAGNOSIS — L82.1 SEBORRHEIC KERATOSES: ICD-10-CM

## 2022-04-16 DIAGNOSIS — D48.5 NEOPLASM OF UNCERTAIN BEHAVIOR OF SKIN: Primary | ICD-10-CM

## 2022-04-16 DIAGNOSIS — D23.30 BENIGN NEOPLASM OF SKIN OF FACE: ICD-10-CM

## 2022-04-16 DIAGNOSIS — D23.70 BENIGN NEOPLASM OF SKIN OF LOWER LIMB, INCLUDING HIP, UNSPECIFIED LATERALITY: ICD-10-CM

## 2022-04-16 DIAGNOSIS — D23.5 BENIGN NEOPLASM OF SKIN OF TRUNK, EXCEPT SCROTUM: ICD-10-CM

## 2022-04-16 PROCEDURE — 88305 TISSUE EXAM BY PATHOLOGIST: CPT | Performed by: DERMATOLOGY

## 2022-04-16 PROCEDURE — 99213 OFFICE O/P EST LOW 20 MIN: CPT | Performed by: DERMATOLOGY

## 2022-04-16 PROCEDURE — 11102 TANGNTL BX SKIN SINGLE LES: CPT | Performed by: DERMATOLOGY

## 2022-04-16 NOTE — PROGRESS NOTES
Operative Report                     Shave/  Tangential biopsy     Clinical diagnosis:    Size of lesion:  pt with ncrusted papule post cryo x 2  Spec 1 Description >>>>>: left extensor elbow  Spec 1 Comment: r/o BCC crusted 4x5mm papule  Location:    Procedure: With patient in appropriate position the skin of the above was scrubbed with alcohol. Anesthesia was obtained with 1% Xylocaine with epinephrine. The skin surrounding the lesion was placed under tension and the lesion was incised using a #15 scalpel blade. The specimen was sent for histopathologic exam.    Hemostasis was obtained with electrocautery/aluminum chloride. Estimated blood loss less than 2 cc. Biopsy dressed with Polysporin, bandage. Pressure dressing:   No    Complications: None    Written instructions given and reviewed with patient    Await pathology    Contact information reviewed.     Procedural physician:  Justin Ballard MD

## 2022-05-20 ENCOUNTER — OFFICE VISIT (OUTPATIENT)
Dept: OTOLARYNGOLOGY | Facility: CLINIC | Age: 67
End: 2022-05-20
Payer: COMMERCIAL

## 2022-05-20 VITALS — BODY MASS INDEX: 26.05 KG/M2 | HEIGHT: 63 IN | WEIGHT: 147 LBS

## 2022-05-20 DIAGNOSIS — H92.02 LEFT EAR PAIN: Primary | ICD-10-CM

## 2022-05-20 PROCEDURE — 99203 OFFICE O/P NEW LOW 30 MIN: CPT | Performed by: OTOLARYNGOLOGY

## 2022-05-20 PROCEDURE — 3008F BODY MASS INDEX DOCD: CPT | Performed by: OTOLARYNGOLOGY

## 2022-05-20 RX ORDER — CYCLOBENZAPRINE HCL 5 MG
5 TABLET ORAL NIGHTLY
Qty: 30 TABLET | Refills: 1 | Status: SHIPPED | OUTPATIENT
Start: 2022-05-20

## 2022-05-20 RX ORDER — MELOXICAM 15 MG/1
15 TABLET ORAL DAILY
Qty: 30 TABLET | Refills: 3 | Status: SHIPPED | OUTPATIENT
Start: 2022-05-20

## 2022-06-27 ENCOUNTER — OFFICE VISIT (OUTPATIENT)
Dept: ALLERGY | Facility: CLINIC | Age: 67
End: 2022-06-27
Payer: COMMERCIAL

## 2022-06-27 ENCOUNTER — LAB ENCOUNTER (OUTPATIENT)
Dept: LAB | Age: 67
End: 2022-06-27
Attending: ALLERGY & IMMUNOLOGY
Payer: COMMERCIAL

## 2022-06-27 ENCOUNTER — TELEPHONE (OUTPATIENT)
Dept: ALLERGY | Facility: CLINIC | Age: 67
End: 2022-06-27

## 2022-06-27 ENCOUNTER — NURSE ONLY (OUTPATIENT)
Dept: ALLERGY | Facility: CLINIC | Age: 67
End: 2022-06-27
Payer: COMMERCIAL

## 2022-06-27 VITALS
HEART RATE: 52 BPM | DIASTOLIC BLOOD PRESSURE: 73 MMHG | SYSTOLIC BLOOD PRESSURE: 129 MMHG | WEIGHT: 147 LBS | BODY MASS INDEX: 26.05 KG/M2 | OXYGEN SATURATION: 97 % | HEIGHT: 63 IN

## 2022-06-27 DIAGNOSIS — J34.2 NASAL SEPTAL DEVIATION: ICD-10-CM

## 2022-06-27 DIAGNOSIS — J30.89 ENVIRONMENTAL AND SEASONAL ALLERGIES: ICD-10-CM

## 2022-06-27 DIAGNOSIS — Z92.29 COVID-19 VACCINE SERIES COMPLETED: ICD-10-CM

## 2022-06-27 DIAGNOSIS — T63.481A HYMENOPTERA REACTION: ICD-10-CM

## 2022-06-27 DIAGNOSIS — J30.2 SEASONAL AND PERENNIAL ALLERGIC RHINOCONJUNCTIVITIS: Primary | ICD-10-CM

## 2022-06-27 DIAGNOSIS — J30.89 SEASONAL AND PERENNIAL ALLERGIC RHINOCONJUNCTIVITIS: Primary | ICD-10-CM

## 2022-06-27 DIAGNOSIS — H10.10 SEASONAL AND PERENNIAL ALLERGIC RHINOCONJUNCTIVITIS: Primary | ICD-10-CM

## 2022-06-27 PROCEDURE — 36415 COLL VENOUS BLD VENIPUNCTURE: CPT

## 2022-06-27 PROCEDURE — 86003 ALLG SPEC IGE CRUDE XTRC EA: CPT

## 2022-06-27 PROCEDURE — 95024 IQ TESTS W/ALLERGENIC XTRCS: CPT | Performed by: ALLERGY & IMMUNOLOGY

## 2022-06-27 PROCEDURE — 95004 PERQ TESTS W/ALRGNC XTRCS: CPT | Performed by: ALLERGY & IMMUNOLOGY

## 2022-06-27 RX ORDER — LEVOCETIRIZINE DIHYDROCHLORIDE 5 MG/1
5 TABLET, FILM COATED ORAL EVERY EVENING
Qty: 90 TABLET | Refills: 1 | Status: SHIPPED | OUTPATIENT
Start: 2022-06-27

## 2022-06-27 RX ORDER — FLUTICASONE PROPIONATE 50 MCG
2 SPRAY, SUSPENSION (ML) NASAL DAILY
Qty: 3 EACH | Refills: 0 | Status: SHIPPED | OUTPATIENT
Start: 2022-06-27

## 2022-06-27 RX ORDER — EPINEPHRINE 0.3 MG/.3ML
INJECTION SUBCUTANEOUS
Qty: 1 EACH | Refills: 0 | Status: SHIPPED | OUTPATIENT
Start: 2022-06-27

## 2022-06-27 NOTE — PATIENT INSTRUCTIONS
#1 allergic rhinitis  Many years. Year-round in nature worsening in the spring more so than summer. No current medications. Patient has tried Pazeo eyedrops in the past, Zyrtec Xyzal and Flonase. Exposed to a cat at her family members home. No pets at home at this time    Reviewed avoidance measures and potential treatment options immunotherapy  Recommended trial of Flonase 2 sprays per nostril on a daily basis. May take 3 to 5 days to take full effect and should be used daily to prevent symptoms  Began Xyzal, levocetirizine 5 mg once a day as an antihistamine if she is having significant runny nose sneezing itchy watery eyes  Continue with Pataday extra strength 0.7% 1 drop per eye 1-2 times per day as needed  Handouts on nonallergic rhinitis provided and reviewed as well    #2 nasal septal deviation  Followed by ENT    #3 COVID vaccines up-to-date recommend for dose for 50 and older    #4 prior hymenoptera reaction. EpiPen up-to-date  Reviewed potential serum IgE testing to hymenoptera to further evaluate as an allergic trigger.   Reviewed potential done immunotherapy if IgE is detected  EpiPen and Benadryl as needed

## 2022-06-28 ENCOUNTER — PATIENT MESSAGE (OUTPATIENT)
Dept: ALLERGY | Facility: CLINIC | Age: 67
End: 2022-06-28

## 2022-06-28 NOTE — TELEPHONE ENCOUNTER
Spoke with patient. Verified name and . Patient states she had an appointment yesterday,22 and tested negative for allergies. Patient states she woke up around 2 am with a burning sensation in her left upper arm where testing was performed. Patient states she has three areas from the testing that are raised, red and very itchy and has applied Hydrocortisone cream, cool compresses and is taking Xyzal with little relief. Patient denies hives, no facial or mouth swelling, no problems breathing or swallowing. Patient is asking if she is sensitive to whatever was in the testing? Informed patient usually for a test to be positive reaction occurs within 15 minutes and will forward this message to Dr. Jr Chen for further directions. patient verbalizes understanding.

## 2022-06-28 NOTE — TELEPHONE ENCOUNTER
Spoke with patient. Informed patient of Dr. Yeimi Kitchen recommendations ( see Dr. Yeimi Kitchen message below). Patient verbalizes understanding, no further questions at this time.

## 2022-06-28 NOTE — TELEPHONE ENCOUNTER
----- Message from Kirsten Gurrola sent at 6/28/2022  9:11 AM CDT -----  Regarding: Arm itching & body ache  Good Morning Dr. Dileep Patton. My left arm is itching severely at the allergy test injection sites and I am experiencing severe body ache. Three injection sites are raised, red and hard, best described as welts. Also, I have Fibromyalgia and wonder if the injections could be causing a reaction. Please call my cell [phone]. Thanks!  Deborah Dockery

## 2022-06-28 NOTE — TELEPHONE ENCOUNTER
Regarding: FW: Arm itching & body ache      ----- Message -----  From: Bri Summers RN  Sent: 6/28/2022  11:19 AM CDT  To: Kelley Parsons MD  Subject: Arm itching & body ache                          ----- Message from Mikala Alba RN sent at 6/28/2022 11:19 AM CDT -----       ----- Message from Lena Glover to Kendall Ayala MD sent at 6/28/2022  9:11 AM -----   Good Morning Dr. Eleazar Rader. My left arm is itching severely at the allergy test injection sites and I am experiencing severe body ache. Three injection sites are raised, red and hard, best described as welts. Also, I have Fibromyalgia and wonder if the injections could be causing a reaction. Please call my cell [phone]. Thanks!  Shanice Rocha

## 2022-06-28 NOTE — TELEPHONE ENCOUNTER
Call reviewed and noted. Agree with triage advice provided any reactions after a 15 to 30-minute. Are probably not IgE mediated. Probably more T-cell process with a delayed reaction. His delayed reactions are typically not IgE mediated.   Continue with supportive care cool compresses hydrocortisone antihistamines

## 2022-06-29 LAB
ALLERGEN, HONEYBEE VENOM IGE: <0.1 KU/L
ALLERGEN, PAPER WASP VENOM IGE: <0.1 KU/L
ALLERGEN, WHITE-FACED HORNET: <0.1 KU/L
ALLERGEN, YELLOW JACKET VENOM: <0.1 KU/L
ALLERGEN, YELLOW-FACED HORNET: <0.1 KU/L

## 2022-08-20 ENCOUNTER — PATIENT MESSAGE (OUTPATIENT)
Dept: ALLERGY | Facility: CLINIC | Age: 67
End: 2022-08-20

## 2022-08-20 NOTE — TELEPHONE ENCOUNTER
From: Mat Andrade  To: Sarah Dunn MD  Sent: 8/20/2022 8:07 AM CDT  Subject: Vergie Isra Wood. Here are some photos of my bumps and rash. I am also experiencing joint and muscle ache.

## 2022-08-24 ENCOUNTER — TELEPHONE (OUTPATIENT)
Dept: DERMATOLOGY CLINIC | Facility: CLINIC | Age: 67
End: 2022-08-24

## 2022-08-26 ENCOUNTER — LAB ENCOUNTER (OUTPATIENT)
Dept: LAB | Age: 67
End: 2022-08-26
Attending: DERMATOLOGY
Payer: MEDICARE

## 2022-08-26 ENCOUNTER — OFFICE VISIT (OUTPATIENT)
Dept: DERMATOLOGY CLINIC | Facility: CLINIC | Age: 67
End: 2022-08-26
Payer: MEDICARE

## 2022-08-26 DIAGNOSIS — M19.90 ARTHRITIS: ICD-10-CM

## 2022-08-26 DIAGNOSIS — L30.9 DERMATITIS: Primary | ICD-10-CM

## 2022-08-26 DIAGNOSIS — L30.9 DERMATITIS: ICD-10-CM

## 2022-08-26 LAB
AMYLASE SERPL-CCNC: 71 U/L (ref 25–115)
C3 SERPL-MCNC: 148 MG/DL (ref 90–180)
C4 SERPL-MCNC: 34.4 MG/DL (ref 10–40)
CANCER AG125 SERPL-ACNC: 6.9 U/ML (ref ?–35)
CRP SERPL-MCNC: <0.29 MG/DL (ref ?–0.3)
ERYTHROCYTE [SEDIMENTATION RATE] IN BLOOD: 22 MM/HR
IGA SERPL-MCNC: 406 MG/DL (ref 70–312)
IGM SERPL-MCNC: 54 MG/DL (ref 43–279)
IMMUNOGLOBULIN PNL SER-MCNC: 993 MG/DL (ref 791–1643)
LIPASE SERPL-CCNC: 287 U/L (ref 73–393)
RHEUMATOID FACT SERPL-ACNC: <10 IU/ML (ref ?–15)

## 2022-08-26 PROCEDURE — 83690 ASSAY OF LIPASE: CPT

## 2022-08-26 PROCEDURE — 86431 RHEUMATOID FACTOR QUANT: CPT

## 2022-08-26 PROCEDURE — 83516 IMMUNOASSAY NONANTIBODY: CPT

## 2022-08-26 PROCEDURE — 83521 IG LIGHT CHAINS FREE EACH: CPT

## 2022-08-26 PROCEDURE — 86334 IMMUNOFIX E-PHORESIS SERUM: CPT

## 2022-08-26 PROCEDURE — 82150 ASSAY OF AMYLASE: CPT

## 2022-08-26 PROCEDURE — 86140 C-REACTIVE PROTEIN: CPT

## 2022-08-26 PROCEDURE — 86036 ANCA SCREEN EACH ANTIBODY: CPT

## 2022-08-26 PROCEDURE — 86618 LYME DISEASE ANTIBODY: CPT

## 2022-08-26 PROCEDURE — 86160 COMPLEMENT ANTIGEN: CPT

## 2022-08-26 PROCEDURE — 84165 PROTEIN E-PHORESIS SERUM: CPT

## 2022-08-26 PROCEDURE — 86038 ANTINUCLEAR ANTIBODIES: CPT

## 2022-08-26 PROCEDURE — 36415 COLL VENOUS BLD VENIPUNCTURE: CPT

## 2022-08-26 PROCEDURE — 86304 IMMUNOASSAY TUMOR CA 125: CPT

## 2022-08-26 PROCEDURE — 85652 RBC SED RATE AUTOMATED: CPT

## 2022-08-26 PROCEDURE — 82784 ASSAY IGA/IGD/IGG/IGM EACH: CPT

## 2022-08-27 NOTE — PROGRESS NOTES
So far pancreatic enzymes and ca 125 negative. - no issues evident with pancreas    Inflammatory markers normal-esr, crp  IgA is elevated. This is usually associated with GI immunity. Await rest of labs.

## 2022-08-29 LAB
ALBUMIN SERPL ELPH-MCNC: 4.55 G/DL (ref 3.75–5.21)
ALBUMIN/GLOB SERPL: 1.54 {RATIO} (ref 1–2)
ALPHA1 GLOB SERPL ELPH-MCNC: 0.26 G/DL (ref 0.19–0.46)
ALPHA2 GLOB SERPL ELPH-MCNC: 0.77 G/DL (ref 0.48–1.05)
B BURGDOR IGG+IGM SER QL: NEGATIVE
B-GLOBULIN SERPL ELPH-MCNC: 0.93 G/DL (ref 0.68–1.23)
GAMMA GLOB SERPL ELPH-MCNC: 0.99 G/DL (ref 0.62–1.7)
KAPPA LC FREE SER-MCNC: 1.77 MG/DL (ref 0.33–1.94)
KAPPA LC FREE/LAMBDA FREE SER NEPH: 1.19 {RATIO} (ref 0.26–1.65)
LAMBDA LC FREE SERPL-MCNC: 1.5 MG/DL (ref 0.57–2.63)
NUCLEAR IGG TITR SER IF: NEGATIVE {TITER}
PROT SERPL-MCNC: 7.5 G/DL (ref 6.4–8.2)

## 2022-09-01 LAB
MYELOPEROX ANTIBODIES, IGG: 0 AU/ML
SERINE PROTEASE 3, IGG: 0 AU/ML

## 2022-09-07 ENCOUNTER — PATIENT MESSAGE (OUTPATIENT)
Dept: DERMATOLOGY CLINIC | Facility: CLINIC | Age: 67
End: 2022-09-07

## 2022-09-08 NOTE — TELEPHONE ENCOUNTER
See message, appt at [facility] noted, bx might be helpful, await update on itching and meds, Xolair?

## 2022-09-09 NOTE — TELEPHONE ENCOUNTER
Pt called because she did not see the [employer] message response. Pt informed of lab results and pt states she experienced these same symptoms around this time last year but will respond in detail to the [employer] message. Await response from pt.

## 2022-09-21 ENCOUNTER — TELEPHONE (OUTPATIENT)
Dept: OTOLARYNGOLOGY | Facility: CLINIC | Age: 67
End: 2022-09-21

## 2022-09-23 RX ORDER — MELOXICAM 15 MG/1
15 TABLET ORAL DAILY
Qty: 90 TABLET | Refills: 1 | Status: SHIPPED | OUTPATIENT
Start: 2022-09-23

## 2022-09-26 ENCOUNTER — PATIENT MESSAGE (OUTPATIENT)
Dept: DERMATOLOGY CLINIC | Facility: CLINIC | Age: 67
End: 2022-09-26

## 2022-09-26 RX ORDER — FLUTICASONE PROPIONATE 50 MCG
SPRAY, SUSPENSION (ML) NASAL
Qty: 48 G | Refills: 0 | Status: SHIPPED | OUTPATIENT
Start: 2022-09-26

## 2022-09-27 RX ORDER — CLINDAMYCIN PHOSPHATE 10 UG/ML
1 LOTION TOPICAL 2 TIMES DAILY
Qty: 60 ML | Refills: 3 | Status: SHIPPED | OUTPATIENT
Start: 2022-09-27

## 2022-09-28 ENCOUNTER — TELEPHONE (OUTPATIENT)
Dept: DERMATOLOGY CLINIC | Facility: CLINIC | Age: 67
End: 2022-09-28

## 2022-09-28 NOTE — TELEPHONE ENCOUNTER
Fax from [facility]    Temporary supply of Clindamycin lot 1%    Placed fax in pa inbox

## 2022-12-27 RX ORDER — FLUTICASONE PROPIONATE 50 MCG
SPRAY, SUSPENSION (ML) NASAL
Qty: 48 G | Refills: 0 | Status: SHIPPED | OUTPATIENT
Start: 2022-12-27

## 2022-12-30 ENCOUNTER — OFFICE VISIT (OUTPATIENT)
Dept: DERMATOLOGY CLINIC | Facility: CLINIC | Age: 67
End: 2022-12-30
Payer: MEDICARE

## 2022-12-30 DIAGNOSIS — L82.0 INFLAMED SEBORRHEIC KERATOSIS: Primary | ICD-10-CM

## 2022-12-30 DIAGNOSIS — L82.1 SEBORRHEIC KERATOSES: ICD-10-CM

## 2022-12-30 DIAGNOSIS — L81.4 LENTIGO: ICD-10-CM

## 2022-12-30 DIAGNOSIS — D23.30 BENIGN NEOPLASM OF SKIN OF FACE: ICD-10-CM

## 2022-12-30 DIAGNOSIS — D23.9 BENIGN NEOPLASM OF SKIN, UNSPECIFIED LOCATION: ICD-10-CM

## 2022-12-30 PROCEDURE — 99213 OFFICE O/P EST LOW 20 MIN: CPT | Performed by: DERMATOLOGY

## 2023-04-25 NOTE — TELEPHONE ENCOUNTER
Please see progress note dated 4/25/2023 The sound like hives? Her father-in-law had poison ivy contact allergy to plants/ from the yard earlier in the summer. Does she think that she picked this up working in the yard?   If it is spreading and would send a longer prednisone taper if she is okay

## 2023-07-22 ENCOUNTER — OFFICE VISIT (OUTPATIENT)
Dept: DERMATOLOGY CLINIC | Facility: CLINIC | Age: 68
End: 2023-07-22

## 2023-07-22 DIAGNOSIS — D23.70 BENIGN NEOPLASM OF SKIN OF LOWER LIMB, INCLUDING HIP, UNSPECIFIED LATERALITY: ICD-10-CM

## 2023-07-22 DIAGNOSIS — L82.1 SEBORRHEIC KERATOSES: Primary | ICD-10-CM

## 2023-07-22 DIAGNOSIS — L30.9 DERMATITIS: ICD-10-CM

## 2023-07-22 DIAGNOSIS — D23.4 BENIGN NEOPLASM OF SCALP AND SKIN OF NECK: ICD-10-CM

## 2023-07-22 DIAGNOSIS — D23.30 BENIGN NEOPLASM OF SKIN OF FACE: ICD-10-CM

## 2023-07-22 DIAGNOSIS — D23.5 BENIGN NEOPLASM OF SKIN OF TRUNK, EXCEPT SCROTUM: ICD-10-CM

## 2023-07-22 DIAGNOSIS — D23.60 BENIGN NEOPLASM OF SKIN OF UPPER LIMB, INCLUDING SHOULDER, UNSPECIFIED LATERALITY: ICD-10-CM

## 2023-07-22 PROCEDURE — 99213 OFFICE O/P EST LOW 20 MIN: CPT | Performed by: DERMATOLOGY

## 2023-07-22 RX ORDER — CLOBETASOL PROPIONATE 0.5 MG/G
1 CREAM TOPICAL 2 TIMES DAILY
Qty: 60 G | Refills: 3 | Status: SHIPPED | OUTPATIENT
Start: 2023-07-22 | End: 2024-07-21

## 2023-07-23 NOTE — PROGRESS NOTES
Loise Felty is a 79year old female. HPI:     CC:  Patient presents with:  Full Skin Exam: Established pt, presents for skin exam, LOV 2022. Mom with BCC. Pt denies any concerns, skin changes        Allergies:  Bee Venom, Sulfa Antibiotics, and Sulfanilamide    HISTORY:    Past Medical History:   Diagnosis Date    Atypical lobular hyperplasia of left breast     Esophageal reflux     Fibromyalgia     H/O measles     H/O mumps     History of chicken pox     IBS (irritable bowel syndrome)     Osteoporosis     Sinus disorder     Sinus problems      Past Surgical History:   Procedure Laterality Date      , 3/96    TUBAL LIGATION        Family History   Problem Relation Age of Onset    Cancer Mother         Cancer-skin    Basal Cell Mother         skin cancer    Other (Other) Mother         Hearing loss/Pulmonary hypertension    Heart Disorder Brother     Cancer Brother         Cancer-bladder    Cancer Father         Cancer-throat    Dementia Maternal Grandmother         Alzheimer's     Stroke Maternal Grandfather     Cataracts Other     Dementia Maternal Aunt         alzheimer's    Heart Disease Brother     Heart Disease Paternal Aunt       Social History     Socioeconomic History    Marital status:    Tobacco Use    Smoking status: Never    Smokeless tobacco: Never   Vaping Use    Vaping Use: Never used   Substance and Sexual Activity    Alcohol use: Yes     Comment: Wine, 2 drinks occasionally    Drug use: Never   Other Topics Concern    Pt has a pacemaker No    Pt has a defibrillator No    Reaction to local anesthetic No    Caffeine Concern Yes     Comment: Coffee, soda, 2 cups daily        Current Outpatient Medications   Medication Sig Dispense Refill    clobetasol 0.05 % External Cream Apply 1 Application topically 2 (two) times daily. 60 g 3    clindamycin 1 % External Lotion Apply 1 Application topically 2 (two) times daily. Apply thin film to affected area(s).  [facility] mL 3    levocetirizine 5 MG Oral Tab Take 1 tablet (5 mg total) by mouth every evening. 90 tablet 1    EPINEPHrine (EPIPEN 2-ELIZABETH) 0.3 MG/0.3ML Injection Solution Auto-injector Inject IM in event of  allergic reaction 1 each 0    metRONIDAZOLE 0.75 % External Cream Use bid to affected areas of face 60 g 12    Lactobacillus (ACIDOPHILUS) Oral Cap Take 1 tablet by mouth 2 (two) times daily. Cholecalciferol (VITAMIN D3) 1000 units Oral Cap Take 1 tablet by mouth daily. Vitamin B-12 100 MCG Oral Tab Take 2.5 tablets (250 mcg total) by mouth daily. triamcinolone acetonide 0.1 % External Cream Use bid as directed 454 g 3    Multiple Vitamin (THERA/BETA-CAROTENE) Oral Tab Take 1 tablet by mouth. Multiple Vitamins-Minerals (MULTIVITAMIN OR) Take by mouth. Calcium Carbonate 600 MG Oral Tab Take  by mouth. FLUTICASONE PROPIONATE 50 MCG/ACT Nasal Suspension SHAKE LIQUID AND USE 2 SPRAYS IN EACH NOSTRIL DAILY (Patient not taking: Reported on 7/22/2023) 48 g 0    Meloxicam 15 MG Oral Tab Take 1 tablet (15 mg total) by mouth daily. (Patient not taking: Reported on 7/22/2023) 90 tablet 1    triamcinolone 0.1 % External Ointment Applied 2 times per day as needed 60 g 0    cyclobenzaprine 5 MG Oral Tab Take 1 tablet (5 mg total) by mouth nightly. (Patient not taking: Reported on 6/27/2022) 30 tablet 1    Esomeprazole Magnesium 40 MG Oral Powd Pack Take 44 mg by mouth 2 (two) times daily. (Patient not taking: Reported on 7/22/2023)      Calcium Carbonate-Vitamin D3 600-400 MG-UNIT Oral Tab Take 1 tablet by mouth 2 (two) times daily. Lutein-Zeaxanthin 15-0.7 MG Oral Cap Take 1 capsule by mouth 2 (two) times daily. (Patient not taking: Reported on 5/20/2022)      PEG 3350-KCl-NaBcb-NaCl-NaSulf 236 g Oral Recon Soln  (Patient not taking: Reported on 5/20/2022)      Calcium Carb-Cholecalciferol 600-1000 MG-UNIT Oral Tab Take 600 mg by mouth 2 (two) times daily.       Cholecalciferol 50 MCG (2000 UT) Oral Cap Take 2,000 Units by mouth daily. EPINEPHrine 0.3 MG/0.3ML Injection Solution Auto-injector Inject 0.3 mg into the muscle. Lutein-Zeaxanthin 25-5 MG Oral Cap Take 1 tablet by mouth 2 (two) times daily. (Patient not taking: Reported on 2022)      Raloxifene HCl 60 MG Oral Tab TAKE 1 TABLET BY MOUTH EVERY DAY (Patient not taking: No sig reported)      Tolnaftate 1 % External Solution Apply 1 Application topically. (Patient not taking: No sig reported)      Estradiol 0.1 MG/GM Vaginal Cream Place 1 g vaginally. (Patient not taking: No sig reported)      Meloxicam 15 MG Oral Tab TK 1 T PO QD (Patient not taking: No sig reported)  2    SHINGRIX 50 MCG/0.5ML Intramuscular Recon Susp ADM 0.5ML IM UTD (Patient not taking: No sig reported)  0    triamcinolone acetonide 0.5 % External Cream Use bid as directed to rash on face (Patient not taking: No sig reported) 60 g 1    Raloxifene HCl 60 MG Oral Tab Take 60 mg by mouth daily. (Patient not taking: No sig reported)      Vit C-Cholecalciferol-Carolee Hip 500-1000-20 MG-UNIT-MG Oral Cap Take  by mouth.        Allergies:     Bee Venom               ANAPHYLAXIS  Sulfa Antibiotics       HIVES, SHORTNESS OF BREATH,                            SWELLING    Comment:swelling  Sulfanilamide           SHORTNESS OF BREATH    Past Medical History:   Diagnosis Date    Atypical lobular hyperplasia of left breast     Esophageal reflux     Fibromyalgia     H/O measles     H/O mumps     History of chicken pox     IBS (irritable bowel syndrome)     Osteoporosis     Sinus disorder     Sinus problems     Past Surgical History:   Procedure Laterality Date      , 3/96    TUBAL LIGATION       Social History    Socioeconomic History      Marital status:       Spouse name: Not on file      Number of children: Not on file      Years of education: Not on file      Highest education level: Not on file    Occupational History      Not on file    Tobacco Use Smoking status: Never      Smokeless tobacco: Never    Vaping Use      Vaping Use: Never used    Substance and Sexual Activity      Alcohol use: Yes        Comment: Wine, 2 drinks occasionally      Drug use: Never      Sexual activity: Not on file    Other Topics      Concerns:        Grew up on a farm: Not Asked        History of tanning: Not Asked        Outdoor occupation: Not Asked        Pt has a pacemaker: No        Pt has a defibrillator: No        Breast feeding: Not Asked        Reaction to local anesthetic: No         Service: Not Asked        Blood Transfusions: Not Asked        Caffeine Concern: Yes          Coffee, soda, 2 cups daily        Occupational Exposure: Not Asked        Hobby Hazards: Not Asked        Sleep Concern: Not Asked        Stress Concern: Not Asked        Weight Concern: Not Asked        Special Diet: Not Asked        Back Care: Not Asked        Exercise: Not Asked        Bike Helmet: Not Asked        Seat Belt: Not Asked        Self-Exams: Not Asked    Social History Narrative      Not on file    Social Determinants of Health  Financial Resource Strain: Not on file  Food Insecurity: Not on file  Transportation Needs: Not on file  Physical Activity: Not on file  Stress: Not on file  Social Connections: Not on file  Housing Stability: Not on file  Family History   Problem Relation Age of Onset    Cancer Mother         Cancer-skin    Basal Cell Mother         skin cancer    Other (Other) Mother         Hearing loss/Pulmonary hypertension    Heart Disorder Brother     Cancer Brother         Cancer-bladder    Cancer Father         Cancer-throat    Dementia Maternal Grandmother         Alzheimer's     Stroke Maternal Grandfather     Cataracts Other     Dementia Maternal Aunt         alzheimer's    Heart Disease Brother     Heart Disease Paternal Aunt        There were no vitals filed for this visit.     HPI:  Patient presents with:  Full Skin Exam: Established pt, presents for skin exam, LOV 12/2022. Mom with BCC. Pt denies any concerns, skin changes    Patient with family history of skin cancer, no personal history of skin cancer     Follow-up concerns of lesions over the face, hair loss scalp has become tender again. Has clindamycin at home. Not using anything currently. Severe irritation from minoxidil in the past.  Nothing else really new or different. Has several other questions    Patient presents with concerns above. Patient has been in their usual state of health. Past notes/ records and appropriate/relevant lab results including pathology and past body maps reviewed. Including outside notes/ PCP notes as appropriate. Updated and new information noted in current visit. ROS:  Denies other relevant systemic complaints. History, medications, allergies reviewed as noted. Physical Examination:     Well-developed well-nourished patient alert oriented in no acute distress. Exam performed, including scalp, head, neck, face,nails, hair, external eyes, including conjunctival mucosa, eyelids, lips external ears , arms, digits,palms. Multiple light to medium brown, well marginated, uniformly pigmented, macules and papules 6 mm and less scattered on exam. pigmented lesions examined with dermoscopy benign-appearing patterns. Waxy tannish keratotic papules scattered, cherry-red vascular papules scattered. See map today's date for lesions noted . See assessment and plan below for specific lesions. Otherwise remarkable for lesions as noted on map. See A/P  below for additional information:    Assessment / plan:    No orders of the defined types were placed in this encounter. Meds & Refills for this Visit:  Requested Prescriptions     Signed Prescriptions Disp Refills    clobetasol 0.05 % External Cream 60 g 3     Sig: Apply 1 Application topically 2 (two) times daily.          Seborrheic keratoses  (primary encounter diagnosis)  Benign neoplasm of skin of face  Benign neoplasm of scalp and skin of neck  Benign neoplasm of skin of trunk, except scrotum  Benign neoplasm of skin of upper limb, including shoulder, unspecified laterality  Benign neoplasm of skin of lower limb, including hip, unspecified laterality  Dermatitis    Flesh-colored papule right nasolabial fold, papule excoriated 2 mm at left cheek. Likely fibrous papules. Monitor. Few scattered benign keratoses flat. Rare cherry angioma. Scattered nevi no atypical appearing lesions presently. Stable, benign-appearing    Scattered lentigines keratomas benign-appearing nevi no other suspicious lesions   please refer to map for specific lesions. See additional diagnoses. Pros cons of various therapies, risks benefits discussed. Pathophysiology discussed with patient. Therapeutic options reviewed. See  Medications in grid. Instructions reviewed at length. Few lentigines over the face retinol, monitor. Continue sun protection    Patient with questions regarding earlobe and stretching of piercing sites. Refer to Dr. Chapis Leyva for otoplasty    Scalp with increasing erythema around ruth ann of hairs at vertex scalp, left vertex with expansion of patch of alopecia. Given inflammation continue clindamycin twice daily, add clobetasol. Send refill for do not have this. They do have triamcinolone cream may use this instead. Possible compound cream base with minoxidil may be better tolerated consider. Pruritus comes and goes. Triamcinolone helpful continue along with moisturizers, CeraVe anti-itch    No other susupicious lesions on todays  exam.      Benign nevi, seborrheic  keratoses, cherry angiomas:  Reassurance regarding other benign skin lesions. Signs and symptoms of skin cancer, ABCDE's of melanoma discussed with patient. Sunscreen use, sun protection, self exams reviewed. Followup as noted RTC ---routine checkup    6 mos -one year or p.r.n.     Encounter Times   Including precharting, reviewing chart, prior notes obtaining history: 10 minutes, medical exam :10 minutes, notes on body map, plan, counseling 10minutes My total time spent caring for the patient on the day of the encounter: 30 minutes     The patient indicates understanding of these issues and agrees to the plan. The patient is asked to return as noted in follow-up/ above. This note was generated using Dragon voice recognition software. Please contact me regarding any confusion resulting from errors in recognition. Savana Stovall

## 2023-07-28 ENCOUNTER — OFFICE VISIT (OUTPATIENT)
Dept: AUDIOLOGY | Facility: CLINIC | Age: 68
End: 2023-07-28

## 2023-07-28 ENCOUNTER — OFFICE VISIT (OUTPATIENT)
Dept: OTOLARYNGOLOGY | Facility: CLINIC | Age: 68
End: 2023-07-28

## 2023-07-28 DIAGNOSIS — H93.19 TINNITUS, UNSPECIFIED LATERALITY: ICD-10-CM

## 2023-07-28 DIAGNOSIS — R42 DIZZINESS: Primary | ICD-10-CM

## 2023-07-28 PROBLEM — R10.11 RIGHT UPPER QUADRANT ABDOMINAL PAIN: Status: ACTIVE | Noted: 2021-11-26

## 2023-07-28 PROBLEM — E04.2 MULTIPLE THYROID NODULES: Status: ACTIVE | Noted: 2018-12-05

## 2023-07-28 PROBLEM — M67.919 DISORDER OF ROTATOR CUFF: Status: ACTIVE | Noted: 2021-10-07

## 2023-07-28 PROBLEM — K76.0 FATTY (CHANGE OF) LIVER, NOT ELSEWHERE CLASSIFIED: Status: ACTIVE | Noted: 2022-10-05

## 2023-07-28 PROBLEM — F43.9 STRESS: Status: ACTIVE | Noted: 2020-09-29

## 2023-07-28 PROBLEM — E53.8 LOW VITAMIN B12 LEVEL: Status: ACTIVE | Noted: 2022-10-05

## 2023-07-28 PROBLEM — G47.33 OBSTRUCTIVE SLEEP APNEA SYNDROME: Status: ACTIVE | Noted: 2020-09-29

## 2023-07-28 PROBLEM — E55.9 VITAMIN D DEFICIENCY: Status: ACTIVE | Noted: 2019-02-22

## 2023-07-28 PROBLEM — R79.89 LOW VITAMIN B12 LEVEL: Status: ACTIVE | Noted: 2022-10-05

## 2023-07-28 PROBLEM — T14.8XXS: Status: ACTIVE | Noted: 2019-05-30

## 2023-07-28 PROBLEM — E78.5 HYPERLIPIDEMIA: Status: ACTIVE | Noted: 2017-03-15

## 2023-07-28 PROBLEM — M81.0 OSTEOPOROSIS: Status: ACTIVE | Noted: 2018-12-05

## 2023-07-28 PROBLEM — M75.22 BICIPITAL TENDINITIS, LEFT SHOULDER: Status: ACTIVE | Noted: 2021-10-07

## 2023-07-28 PROCEDURE — 92552 PURE TONE AUDIOMETRY AIR: CPT | Performed by: AUDIOLOGIST

## 2023-07-28 PROCEDURE — 92556 SPEECH AUDIOMETRY COMPLETE: CPT | Performed by: AUDIOLOGIST

## 2023-07-28 PROCEDURE — 92567 TYMPANOMETRY: CPT | Performed by: AUDIOLOGIST

## 2023-07-28 PROCEDURE — 99213 OFFICE O/P EST LOW 20 MIN: CPT | Performed by: OTOLARYNGOLOGY

## 2023-07-29 NOTE — PROGRESS NOTES
John Talavera is a 79year old female. Patient presents with:  Dizziness: Patient is here due to dizziness  Ringing In Ear: Patient is here due to ringing in ears. HISTORY OF PRESENT ILLNESS  She presents with 6-month history of issues with her ear. In the beginning she felt a pimple on her ear had some blood and pus come out. Since then she has had recurrent episodes of left-sided ear pain. On occasion she does feel that the canal somewhat narrowed. She does grind her teeth and has done so for a long time. Has fibromyalgia. Feels fullness weird sensations in her left ear. Some headaches temporarily and primarily on the left. Currently not wearing a bite guard and getting a new one and dealing with a cracked upper tooth on the right. No other signs, symptoms or complaints other than occasional minimal drainage which sounds like wax from the left. 7/28/23 recently had an illness and developed some dizziness and tinnitus. That has all resolved at this point and today has no complaints or concerns. Audiogram was performed today based on the symptoms with finding of complete normal hearing at this time. She also has concerns about lacerations to her ears where her piercings were able to go through the holes due to how large the piercings are at this point.     Social History    Socioeconomic History      Marital status:     Tobacco Use      Smoking status: Never      Smokeless tobacco: Never    Vaping Use      Vaping Use: Never used    Substance and Sexual Activity      Alcohol use: Yes        Comment: Wine, 2 drinks occasionally      Drug use: Never    Other Topics      Concerns:        Pt has a pacemaker: No        Pt has a defibrillator: No        Reaction to local anesthetic: No        Caffeine Concern: Yes          Coffee, soda, 2 cups daily      Family History   Problem Relation Age of Onset    Cancer Mother         Cancer-skin    Basal Cell Mother         skin cancer    Other (Other) Mother         Hearing loss/Pulmonary hypertension    Heart Disorder Brother     Cancer Brother         Cancer-bladder    Cancer Father         Cancer-throat    Dementia Maternal Grandmother         Alzheimer's     Stroke Maternal Grandfather     Cataracts Other     Dementia Maternal Aunt         alzheimer's    Heart Disease Brother     Heart Disease Paternal Aunt        Past Medical History:   Diagnosis Date    Atypical lobular hyperplasia of left breast     Esophageal reflux     Fibromyalgia     H/O measles     H/O mumps     History of chicken pox     IBS (irritable bowel syndrome)     Osteoporosis     Sinus disorder     Sinus problems       Past Surgical History:   Procedure Laterality Date      , 3/96    TUBAL LIGATION           REVIEW OF SYSTEMS    System Neg/Pos Details   Constitutional Negative Fatigue, fever and weight loss. ENMT Negative Drooling. Eyes Negative Blurred vision and vision changes. Respiratory Negative Dyspnea and wheezing. Cardio Negative Chest pain, irregular heartbeat/palpitations and syncope. GI Negative Abdominal pain and diarrhea. Endocrine Negative Cold intolerance and heat intolerance. Neuro Negative Tremors. Psych Negative Anxiety and depression. Integumentary Negative Frequent skin infections, pigment change and rash. Hema/Lymph Negative Easy bleeding and easy bruising. PHYSICAL EXAM    There were no vitals taken for this visit. Constitutional Normal Overall appearance - Normal.   Psychiatric Normal Orientation - Oriented to time, place, person & situation. Appropriate mood and affect.    Neck Exam Normal Inspection - Normal. Palpation - Normal. Parotid gland - Normal. Thyroid gland - Normal.   Eyes Normal Conjunctiva - Right: Normal, Left: Normal. Pupil - Right: Normal, Left: Normal. Fundus - Right: Normal, Left: Normal.   Neurological Normal Memory - Normal. Cranial nerves - Cranial nerves II through XII grossly intact. Head/Face Normal Facial features - Normal. Eyebrows - Normal. Skull - Normal.        Nasopharynx Normal External nose - Normal. Lips/teeth/gums - Normal. Tonsils - Normal. Oropharynx - Normal.   Ears Normal Inspection - Right: Redundant earlobe skin with wide piercings left: Redundant earlobe skin with wide piercings canal - Right: Normal, Left: Normal. TM - Right: Normal, Left: Normal.   Skin Normal Inspection - Normal.        Lymph Detail Normal Submental. Submandibular. Anterior cervical. Posterior cervical. Supraclavicular. Nose/Mouth/Throat Normal External nose - Normal. Lips/teeth/gums - Normal. Tonsils - Normal. Oropharynx - Normal.   Nose/Mouth/Throat Normal Nares - Right: Normal Left: Normal. Septum -Normal  Turbinates - Right: Normal, Left: Normal.       Current Outpatient Medications:     clobetasol 0.05 % External Cream, Apply 1 Application topically 2 (two) times daily. , Disp: 60 g, Rfl: 3    clindamycin 1 % External Lotion, Apply 1 Application topically 2 (two) times daily. Apply thin film to affected area(s). , Disp: 60 mL, Rfl: 3    triamcinolone 0.1 % External Ointment, Applied 2 times per day as needed, Disp: 60 g, Rfl: 0    levocetirizine 5 MG Oral Tab, Take 1 tablet (5 mg total) by mouth every evening., Disp: 90 tablet, Rfl: 1    EPINEPHrine (EPIPEN 2-ELIZABETH) 0.3 MG/0.3ML Injection Solution Auto-injector, Inject IM in event of  allergic reaction, Disp: 1 each, Rfl: 0    metRONIDAZOLE 0.75 % External Cream, Use bid to affected areas of face, Disp: 60 g, Rfl: 12    Lutein-Zeaxanthin 25-5 MG Oral Cap, Take 1 tablet by mouth 2 (two) times daily. , Disp: , Rfl:     Cholecalciferol (VITAMIN D3) 1000 units Oral Cap, Take 1 tablet by mouth daily. , Disp: , Rfl:     Vitamin B-12 100 MCG Oral Tab, Take 2.5 tablets (250 mcg total) by mouth daily. , Disp: , Rfl:     triamcinolone acetonide 0.5 % External Cream, Use bid as directed to rash on face, Disp: 60 g, Rfl: 1    Calcium Carbonate 600 MG Oral Tab, Take  by mouth., Disp: , Rfl:     FLUTICASONE PROPIONATE 50 MCG/ACT Nasal Suspension, SHAKE LIQUID AND USE 2 SPRAYS IN EACH NOSTRIL DAILY (Patient not taking: Reported on 7/22/2023), Disp: 48 g, Rfl: 0    Meloxicam 15 MG Oral Tab, Take 1 tablet (15 mg total) by mouth daily. (Patient not taking: Reported on 7/22/2023), Disp: 90 tablet, Rfl: 1    cyclobenzaprine 5 MG Oral Tab, Take 1 tablet (5 mg total) by mouth nightly. (Patient not taking: Reported on 6/27/2022), Disp: 30 tablet, Rfl: 1    Esomeprazole Magnesium 40 MG Oral Powd Pack, Take 44 mg by mouth 2 (two) times daily. (Patient not taking: Reported on 7/22/2023), Disp: , Rfl:     Calcium Carbonate-Vitamin D3 600-400 MG-UNIT Oral Tab, Take 1 tablet by mouth 2 (two) times daily. , Disp: , Rfl:     Lutein-Zeaxanthin 15-0.7 MG Oral Cap, Take 1 capsule by mouth 2 (two) times daily. (Patient not taking: Reported on 5/20/2022), Disp: , Rfl:     Lactobacillus (ACIDOPHILUS) Oral Cap, Take 1 tablet by mouth 2 (two) times daily. , Disp: , Rfl:     PEG 3350-KCl-NaBcb-NaCl-NaSulf 236 g Oral Recon Soln, , Disp: , Rfl:     Calcium Carb-Cholecalciferol 600-1000 MG-UNIT Oral Tab, Take 600 mg by mouth 2 (two) times daily. , Disp: , Rfl:     Cholecalciferol 50 MCG (2000 UT) Oral Cap, Take 2,000 Units by mouth daily. , Disp: , Rfl:     EPINEPHrine 0.3 MG/0.3ML Injection Solution Auto-injector, Inject 0.3 mg into the muscle., Disp: , Rfl:     Raloxifene HCl 60 MG Oral Tab, TAKE 1 TABLET BY MOUTH EVERY DAY (Patient not taking: No sig reported), Disp: , Rfl:     Tolnaftate 1 % External Solution, Apply 1 Application topically. (Patient not taking: No sig reported), Disp: , Rfl:     triamcinolone acetonide 0.1 % External Cream, Use bid as directed, Disp: 454 g, Rfl: 3    Estradiol 0.1 MG/GM Vaginal Cream, Place 1 g vaginally.  (Patient not taking: No sig reported), Disp: , Rfl:     Meloxicam 15 MG Oral Tab, TK 1 T PO QD (Patient not taking: No sig reported), Disp: , Rfl: 2    Multiple Vitamin (THERA/BETA-CAROTENE) Oral Tab, Take 1 tablet by mouth., Disp: , Rfl:     SHINGRIX 50 MCG/0.5ML Intramuscular Recon Susp, ADM 0.5ML IM UTD (Patient not taking: No sig reported), Disp: , Rfl: 0    Raloxifene HCl 60 MG Oral Tab, Take 60 mg by mouth daily. (Patient not taking: No sig reported), Disp: , Rfl:     Multiple Vitamins-Minerals (MULTIVITAMIN OR), Take by mouth., Disp: , Rfl:     Vit C-Cholecalciferol-Carolee Hip 251-9939-99 MG-UNIT-MG Oral Cap, Take  by mouth., Disp: , Rfl:   ASSESSMENT AND PLAN    1. Dizziness  - OP REFERRAL TO AUDIOLOGY    2. Tinnitus, unspecified laterality  Dizziness and tinnitus. Be resolved. At this point no further management. We did review her audiogram which demonstrates complete normal hearing at all frequencies. In addition we did discuss her concerns about her earlobes and the fact that her earrings passed through the holes were easily. We did discuss excising these piercings and reconstructing the ear so that she can be pierced in the future. She will consider this option and I would recommend that she have this done on the right ear to make sure that this lobe is left appropriately positioned before we addressed the left side. She agrees with this plan and will return to see me at her convenience. - OP REFERRAL TO AUDIOLOGY        This note was prepared using [employer] [facility] [employer] voice recognition dictation software. As a result errors may occur. When identified these errors have been corrected. While every attempt is made to correct errors during dictation discrepancies may still exist    Sukhi Rose MD    7/29/2023    12:34 PM

## 2023-07-31 ENCOUNTER — TELEPHONE (OUTPATIENT)
Dept: OTOLARYNGOLOGY | Facility: CLINIC | Age: 68
End: 2023-07-31

## 2023-07-31 NOTE — TELEPHONE ENCOUNTER
Pt called requesting to speak to the nurse. When will the schedule be available and what time in the last appointment on Saturday.   Please call

## 2023-07-31 NOTE — TELEPHONE ENCOUNTER
Pt states she was told to call and schedule a procedure with  for his last appt on a Saturday - she is not sure what type of procedure he wants to do

## 2023-07-31 NOTE — TELEPHONE ENCOUNTER
Unable to leave message voice mail box full, Saturday appointment not yet available for . will contact pt once available. Carol Hsu will be working Aug 19th.

## 2023-08-05 ENCOUNTER — TELEPHONE (OUTPATIENT)
Dept: DERMATOLOGY CLINIC | Facility: CLINIC | Age: 68
End: 2023-08-05

## 2023-08-05 NOTE — TELEPHONE ENCOUNTER
Question received from pt may use her preferred shampoo. Ideally paraben and sulfate free. Possible compound from [facility] if needed- will need to review available compounds. Geovanna has several frequent mixtures if needed. Continue clindamycin for now. Pt informed.

## 2023-08-09 RX ORDER — TRIAMCINOLONE ACETONIDE 5 MG/G
CREAM TOPICAL
Qty: 60 G | Refills: 1 | Status: SHIPPED | OUTPATIENT
Start: 2023-08-09

## 2023-08-09 NOTE — TELEPHONE ENCOUNTER
Refill Request for medication(s): triamcinolone acetonide 0.5 % External Cream     Last Office Visit: 07/22/23     Last Refill: 06/30/22 (Dr Cheryle Ripple)    Pharmacy, Dosage verified: [facility] [id number] #[id number] - [address], [address], [phone], [phone]    Rx pended and sent to provider for approval, please advise. Thank You!    ------------------------------------  Refill Request for medication(s): metRONIDAZOLE 0.75 % External Cream    Last Refill: 06/22/21    Pharmacy, Dosage verified: [facility] [id number] #[id number] - [address], [address], [phone], [phone]     Rx pended and sent to provider for approval, please advise. Thank You!

## 2023-08-10 ENCOUNTER — TELEPHONE (OUTPATIENT)
Dept: DERMATOLOGY CLINIC | Facility: CLINIC | Age: 68
End: 2023-08-10

## 2023-08-10 DIAGNOSIS — R76.8 HIGH TOTAL SERUM IGA: ICD-10-CM

## 2023-08-10 DIAGNOSIS — L50.9 URTICARIA: Primary | ICD-10-CM

## 2023-08-10 RX ORDER — METHYLPREDNISOLONE 4 MG/1
TABLET ORAL
Qty: 21 TABLET | Refills: 1 | Status: SHIPPED | OUTPATIENT
Start: 2023-08-10

## 2023-08-10 NOTE — TELEPHONE ENCOUNTER
Insect bites several days ago using topical steroids and po benadryl worse now.   Po medrol pack sent

## 2023-08-12 NOTE — TELEPHONE ENCOUNTER
Pt frustrated with repeated outbreaks. We will check food allergy panel this has not been done previously to see celiac disease panel which has been consistently negative. IgA has been elevated possible GI allergy.   May need to follow-up with allergy at [facility] or Susana Maguire

## 2023-08-15 ENCOUNTER — PATIENT MESSAGE (OUTPATIENT)
Dept: OTOLARYNGOLOGY | Facility: CLINIC | Age: 68
End: 2023-08-15

## 2023-08-15 ENCOUNTER — TELEPHONE (OUTPATIENT)
Dept: OTOLARYNGOLOGY | Facility: CLINIC | Age: 68
End: 2023-08-15

## 2023-08-15 NOTE — TELEPHONE ENCOUNTER
Pt has procedure scheduled for 8/19- the area is inflamed now - should she reschedule - she sent pictures in [address]

## 2023-08-16 ENCOUNTER — TELEPHONE (OUTPATIENT)
Dept: OTOLARYNGOLOGY | Facility: CLINIC | Age: 68
End: 2023-08-16

## 2023-08-16 NOTE — TELEPHONE ENCOUNTER
From: Charles Pressley  To: Baldemar Posada. Rema Horton MD  Sent: 8/15/2023 6:49 AM CDT  Subject: Experiencing spreading skin inflammation    Good Morning Dr. Rema Horton. I am scheduled for an ear lobe procedure with you at 11:40 Saturday morning, but I am experiencing rapidly spreading skin inflammation, including my ears. I am being treated with oral and topical steroids without great success. Do you think we should reschedule the procedure after the inflammation subsides? I can send photos if that would be helpful.  Thanks, Nicolas Medrano

## 2023-08-16 NOTE — TELEPHONE ENCOUNTER
Spoke with patient. She is still having diffuse erythema of the skin. Has not improved much with steroids. Managed with another provider. Does not want to have surgery until this is under better control if agreeable with Dr. Sarah Adams. Please help reschedule.

## 2023-08-16 NOTE — TELEPHONE ENCOUNTER
Yes ear lob procedure is completely elective so she should wait until the skin issue is completely resolved.

## 2023-08-16 NOTE — TELEPHONE ENCOUNTER
Spoke with patient and explained that erythema skin issue should be completely resolved before elective ear lobe surgery should be rescheduled, verbalized understanding. Patient states she will need a Saturday OR date. Patient is under the care of her PCP, Allergist, and dermatologist for this issue currently.     Roopa Vázquez, patient is requesting a call to reschedule her surgery as usually this issue is resolved by the end of this month in the past.

## 2023-08-16 NOTE — TELEPHONE ENCOUNTER
This procedure is done in the office under local anesthesia. This does not need to be scheduled by Megan Calderon. Patient simply has to schedule appointment on a Saturday around 11:30 PM when I am available in the office.

## 2023-08-16 NOTE — TELEPHONE ENCOUNTER
From: Saray Medellin  To: Katina Echevarria MD  Sent: 8/15/2023 8:39 AM CDT  Subject: Brian Francis' Inflamation Photo    I have more photos of the inflamation on my face, earsand arms, neck, legs and arms. The photo is washed out, as the inflammation is bright red.

## 2023-08-17 NOTE — TELEPHONE ENCOUNTER
Saturday schedule not currently out at this time. Will need to be called back and scheduled once schedule is out.

## 2023-08-29 ENCOUNTER — MED REC SCAN ONLY (OUTPATIENT)
Dept: DERMATOLOGY CLINIC | Facility: CLINIC | Age: 68
End: 2023-08-29

## 2023-08-30 ENCOUNTER — OFFICE VISIT (OUTPATIENT)
Dept: DERMATOLOGY CLINIC | Facility: CLINIC | Age: 68
End: 2023-08-30

## 2023-08-30 ENCOUNTER — PATIENT MESSAGE (OUTPATIENT)
Dept: DERMATOLOGY CLINIC | Facility: CLINIC | Age: 68
End: 2023-08-30

## 2023-08-30 ENCOUNTER — TELEPHONE (OUTPATIENT)
Dept: DERMATOLOGY CLINIC | Facility: CLINIC | Age: 68
End: 2023-08-30

## 2023-08-30 DIAGNOSIS — L30.9 DERMATITIS: Primary | ICD-10-CM

## 2023-08-30 PROCEDURE — 99213 OFFICE O/P EST LOW 20 MIN: CPT | Performed by: DERMATOLOGY

## 2023-09-10 NOTE — PROGRESS NOTES
Joaquin Haynes is a 79year old female. Patient presents with:  Rash: RPE:49/46/95 Pt here for Rash and Bump on top of head. Bee Venom, Sulfa Antibiotics, and Sulfanilamide  Current Outpatient Medications   Medication Sig Dispense Refill    methylPREDNISolone 4 MG Oral Tablet Therapy Pack Take as dir on pack 21 tablet 1    triamcinolone 0.5 % External Cream Use bid as directed to rash on face 60 g 1    metRONIDAZOLE 0.75 % External Cream Use bid to affected areas of face 60 g 12    clobetasol 0.05 % External Cream Apply 1 Application topically 2 (two) times daily. 60 g 3    clindamycin 1 % External Lotion Apply 1 Application topically 2 (two) times daily. Apply thin film to affected area(s). 60 mL 3    triamcinolone 0.1 % External Ointment Applied 2 times per day as needed 60 g 0    levocetirizine 5 MG Oral Tab Take 1 tablet (5 mg total) by mouth every evening. 90 tablet 1    EPINEPHrine (EPIPEN 2-ELIZABETH) 0.3 MG/0.3ML Injection Solution Auto-injector Inject IM in event of  allergic reaction 1 each 0    Lutein-Zeaxanthin 25-5 MG Oral Cap Take 1 tablet by mouth 2 (two) times daily. Cholecalciferol (VITAMIN D3) 1000 units Oral Cap Take 1 tablet by mouth daily. Vitamin B-12 100 MCG Oral Tab Take 2.5 tablets (250 mcg total) by mouth daily. Calcium Carbonate 600 MG Oral Tab Take  by mouth.         Past Medical History:   Diagnosis Date    Atypical lobular hyperplasia of left breast 2015    Esophageal reflux     Fibromyalgia     H/O measles     H/O mumps     History of chicken pox     IBS (irritable bowel syndrome)     Osteoporosis     Sinus disorder     Sinus problems      Social History:  Social History     Socioeconomic History    Marital status:    Tobacco Use    Smoking status: Never    Smokeless tobacco: Never   Vaping Use    Vaping Use: Never used   Substance and Sexual Activity    Alcohol use: Yes     Comment: Wine, 2 drinks occasionally    Drug use: Never   Other Topics Concern    Pt has a pacemaker No    Pt has a defibrillator No    Reaction to local anesthetic No    Caffeine Concern Yes     Comment: Coffee, soda, 2 cups daily                 Current Outpatient Medications   Medication Sig Dispense Refill    methylPREDNISolone 4 MG Oral Tablet Therapy Pack Take as dir on pack 21 tablet 1    triamcinolone 0.5 % External Cream Use bid as directed to rash on face 60 g 1    metRONIDAZOLE 0.75 % External Cream Use bid to affected areas of face 60 g 12    clobetasol 0.05 % External Cream Apply 1 Application topically 2 (two) times daily. 60 g 3    clindamycin 1 % External Lotion Apply 1 Application topically 2 (two) times daily. Apply thin film to affected area(s). 60 mL 3    triamcinolone 0.1 % External Ointment Applied 2 times per day as needed 60 g 0    levocetirizine 5 MG Oral Tab Take 1 tablet (5 mg total) by mouth every evening. 90 tablet 1    EPINEPHrine (EPIPEN 2-ELIZABETH) 0.3 MG/0.3ML Injection Solution Auto-injector Inject IM in event of  allergic reaction 1 each 0    Lutein-Zeaxanthin 25-5 MG Oral Cap Take 1 tablet by mouth 2 (two) times daily. Cholecalciferol (VITAMIN D3) 1000 units Oral Cap Take 1 tablet by mouth daily. Vitamin B-12 100 MCG Oral Tab Take 2.5 tablets (250 mcg total) by mouth daily. Calcium Carbonate 600 MG Oral Tab Take  by mouth.        Allergies:     Bee Venom               ANAPHYLAXIS  Sulfa Antibiotics       HIVES, SHORTNESS OF BREATH,                            SWELLING    Comment:swelling  Sulfanilamide           SHORTNESS OF BREATH    Past Medical History:   Diagnosis Date    Atypical lobular hyperplasia of left breast     Esophageal reflux     Fibromyalgia     H/O measles     H/O mumps     History of chicken pox     IBS (irritable bowel syndrome)     Osteoporosis     Sinus disorder     Sinus problems     Past Surgical History:   Procedure Laterality Date      , 3/96    TUBAL LIGATION       Social History Socioeconomic History      Marital status:       Spouse name: Not on file      Number of children: Not on file      Years of education: Not on file      Highest education level: Not on file    Occupational History      Not on file    Tobacco Use      Smoking status: Never      Smokeless tobacco: Never    Vaping Use      Vaping Use: Never used    Substance and Sexual Activity      Alcohol use: Yes        Comment: Wine, 2 drinks occasionally      Drug use: Never      Sexual activity: Not on file    Other Topics      Concerns:        Grew up on a farm: Not Asked        History of tanning: Not Asked        Outdoor occupation: Not Asked        Pt has a pacemaker: No        Pt has a defibrillator: No        Breast feeding: Not Asked        Reaction to local anesthetic: No         Service: Not Asked        Blood Transfusions: Not Asked        Caffeine Concern: Yes          Coffee, soda, 2 cups daily        Occupational Exposure: Not Asked        Hobby Hazards: Not Asked        Sleep Concern: Not Asked        Stress Concern: Not Asked        Weight Concern: Not Asked        Special Diet: Not Asked        Back Care: Not Asked        Exercise: Not Asked        Bike Helmet: Not Asked        Seat Belt: Not Asked        Self-Exams: Not Asked    Social History Narrative      Not on file    Social Determinants of Health  Financial Resource Strain: Not on file  Food Insecurity: Not on file  Transportation Needs: Not on file  Physical Activity: Not on file  Stress: Not on file  Social Connections: Not on file  Housing Stability: Not on file  Family History   Problem Relation Age of Onset    Cancer Mother         Cancer-skin    Basal Cell Mother         skin cancer    Other (Other) Mother         Hearing loss/Pulmonary hypertension    Heart Disorder Brother     Cancer Brother         Cancer-bladder    Cancer Father         Cancer-throat    Dementia Maternal Grandmother         Alzheimer's     Stroke Maternal Grandfather     Cataracts Other     Dementia Maternal Aunt         alzheimer's    Heart Disease Brother     Heart Disease Paternal Aunt                       HPI :      Patient presents with:  Rash: CCK:72/53/48 Pt here for Rash and Bump on top of head. Patient concerned lesion on scalp  Had seen allergy at [facility] does it clear that this was, photos. No definitive diagnosis 350 environmental allergy testing, I did not recommend any additional testing. Biopsy in the past for scalp for alopecia at [facility] was consistent with inflammatory scarring process CC CP/LPP. Inflammatory lesions have continued to come and go plaques appear and then disappear. More urticarial lesions improved. More angioedema leg plaques improved. Patient presents with concerns above. Past notes/ records and appropriate/relevant lab results including pathology and past body maps reviewed. Updated and new information noted in current visit. ROS:    Denies any other systemic complaints. No fevers, chills, night sweats, sensitivity to the sun, deeper lumps or bumps. No other skin complaints. History, medications, allergies as noted. Physical examination: Patient  well-developed well-nourished, alert oriented in no acute distress. Exam of involved, appropriate areas of skin performed, including scalp, head, neck, face,nails, hair, external eyes, including conjunctival mucosa, eyelids, lips, external ears, back, chest, abdomen, arms, legs, palms.   Remarkable for lesions as noted   Resolving erythema over the face, scalp fairly stable few     ASSESSMENT AND PLAN:     Dermatitis  (primary encounter diagnosis)    Assessment / plan:    Patient with scarring alopecia continue management clobetasol did burn clindamycin lotion seems to be most soothing may continue with this twice daily not add any additional systemic medication  Would not recommend additional biopsy at this time    More inflammatory dermatitic eruption resolving. Biopsy not likely to be helpful at this point. We will continue monitoring if this does flare consider repeat biopsy. Again highly suspicious for allergic process given the fact this flare seasonally. Does not seem to be related to sun exposure past auto inflammatory testing has been noncontributory. Dermatitis, angioedema not clear this is related to her abdominal complaints has had extensive work-up noncontributory. No food allergy testing environmental allergies pending negative at time of update note. Continue antihistamines which are minimally helpful, steroids minimally helpful with itching, consider alternative anti-inflammatories such as Derian inhibitors. Await any further updates from outside evaluation. No other susupicious lesions on todays  exam.    Pathophysiology discussed with patient. Therapeutic options reviewed. See  Medications in grid. Instructions reviewed at length. General skin care questions answered. Reassurance regarding benign skin lesions. Signs and symptoms of skin cancer, ABCDE's of melanoma briefly reviewed. Sunscreen use, sun protection, encouraged. Followup as noted in rtc or p.r.n. Encounter Times  Including precharting, reviewing chart, prior notes obtaining history: 5 minutes, medical exam :10 minutes, notes on body map, plan, counseling 10minutes My total time spent caring for the patient on the day of the encounter: 25 minutes       The patient indicates understanding of these issues and agrees to the plan. The patient is asked to return as noted in follow-up /as noted above    This note was generated using Dragon voice recognition software. Please contact me regarding any confusion resulting from errors in recognition. Note to patient and family: The [facility] makes medical notes like these available to patients. However, be advised this is a medical document.  It is intended as [id number] communication and monitoring of a patient's care needs. It is written in medical language and may contain abbreviations or verbiage that are unfamiliar. It may appear blunt or direct. Medical documents are intended to carry relevant information, facts as evident and the clinical opinion of the practitioner. No orders of the defined types were placed in this encounter. Meds & Refills for this Visit:   Requested Prescriptions      No prescriptions requested or ordered in this encounter       Dermatitis  (primary encounter diagnosis)    No orders of the defined types were placed in this encounter. Results From Past 48 Hours:  No results found for this or any previous visit (from the past 48 hour(s)). Meds This Visit:      Imaging Orders:  None     Referral Orders:  No orders of the defined types were placed in this encounter.

## 2023-09-20 ENCOUNTER — OFFICE VISIT (OUTPATIENT)
Dept: DERMATOLOGY CLINIC | Facility: CLINIC | Age: 68
End: 2023-09-20

## 2023-09-20 DIAGNOSIS — L82.0 INFLAMED SEBORRHEIC KERATOSIS: Primary | ICD-10-CM

## 2023-09-20 DIAGNOSIS — L30.9 DERMATITIS: ICD-10-CM

## 2023-09-20 DIAGNOSIS — L82.1 SEBORRHEIC KERATOSES: ICD-10-CM

## 2023-09-20 DIAGNOSIS — D23.9 BENIGN NEOPLASM OF SKIN, UNSPECIFIED LOCATION: ICD-10-CM

## 2023-09-20 PROCEDURE — 99213 OFFICE O/P EST LOW 20 MIN: CPT | Performed by: DERMATOLOGY

## 2023-10-01 NOTE — PROGRESS NOTES
Sandy Lockhart is a 79year old female. HPI:     CC:  Patient presents with:  Derm Problem: LOV 23. Pt's mother has hx of BCC. Pt has a spot on her left leg, was treated with cryo before but now it is back. Pt has a new spot of concern behind her left ear for a week, red raised bump.          Allergies:  Bee Venom, Sulfa Antibiotics, and Sulfanilamide    HISTORY:    Past Medical History:   Diagnosis Date    Atypical lobular hyperplasia of left breast     Esophageal reflux     Fibromyalgia     H/O measles     H/O mumps     History of chicken pox     IBS (irritable bowel syndrome)     Osteoporosis     Sinus disorder     Sinus problems      Past Surgical History:   Procedure Laterality Date      , 3/96    TUBAL LIGATION        Family History   Problem Relation Age of Onset    Cancer Mother         Cancer-skin    Basal Cell Mother         skin cancer    Other (Other) Mother         Hearing loss/Pulmonary hypertension    Heart Disorder Brother     Cancer Brother         Cancer-bladder    Cancer Father         Cancer-throat    Dementia Maternal Grandmother         Alzheimer's     Stroke Maternal Grandfather     Cataracts Other     Dementia Maternal Aunt         alzheimer's    Heart Disease Brother     Heart Disease Paternal Aunt       Social History     Socioeconomic History    Marital status:    Tobacco Use    Smoking status: Never    Smokeless tobacco: Never   Vaping Use    Vaping Use: Never used   Substance and Sexual Activity    Alcohol use: Yes     Comment: Wine, 2 drinks occasionally    Drug use: Never   Other Topics Concern    Pt has a pacemaker No    Pt has a defibrillator No    Reaction to local anesthetic No    Caffeine Concern Yes     Comment: Coffee, soda, 2 cups daily        Current Outpatient Medications   Medication Sig Dispense Refill    methylPREDNISolone 4 MG Oral Tablet Therapy Pack Take as dir on pack 21 tablet 1    triamcinolone 0.5 % External Cream Use bid as directed to rash on face 60 g 1    metRONIDAZOLE 0.75 % External Cream Use bid to affected areas of face 60 g 12    clobetasol 0.05 % External Cream Apply 1 Application topically 2 (two) times daily. 60 g 3    clindamycin 1 % External Lotion Apply 1 Application topically 2 (two) times daily. Apply thin film to affected area(s). 60 mL 3    triamcinolone 0.1 % External Ointment Applied 2 times per day as needed 60 g 0    levocetirizine 5 MG Oral Tab Take 1 tablet (5 mg total) by mouth every evening. 90 tablet 1    EPINEPHrine (EPIPEN 2-ELIZABETH) 0.3 MG/0.3ML Injection Solution Auto-injector Inject IM in event of  allergic reaction 1 each 0    Lutein-Zeaxanthin 25-5 MG Oral Cap Take 1 tablet by mouth 2 (two) times daily. Cholecalciferol (VITAMIN D3) 1000 units Oral Cap Take 1 tablet by mouth daily. Vitamin B-12 100 MCG Oral Tab Take 2.5 tablets (250 mcg total) by mouth daily. Calcium Carbonate 600 MG Oral Tab Take  by mouth.        Allergies:     Bee Venom               ANAPHYLAXIS  Sulfa Antibiotics       HIVES, SHORTNESS OF BREATH,                            SWELLING    Comment:swelling  Sulfanilamide           SHORTNESS OF BREATH    Past Medical History:   Diagnosis Date    Atypical lobular hyperplasia of left breast     Esophageal reflux     Fibromyalgia     H/O measles     H/O mumps     History of chicken pox     IBS (irritable bowel syndrome)     Osteoporosis     Sinus disorder     Sinus problems     Past Surgical History:   Procedure Laterality Date      , 3/96    TUBAL LIGATION       Social History    Socioeconomic History      Marital status:       Spouse name: Not on file      Number of children: Not on file      Years of education: Not on file      Highest education level: Not on file    Occupational History      Not on file    Tobacco Use      Smoking status: Never      Smokeless tobacco: Never    Vaping Use      Vaping Use: Never used    Substance and Sexual Activity      Alcohol use: Yes        Comment: Wine, 2 drinks occasionally      Drug use: Never      Sexual activity: Not on file    Other Topics      Concerns:        Grew up on a farm: Not Asked        History of tanning: Not Asked        Outdoor occupation: Not Asked        Pt has a pacemaker: No        Pt has a defibrillator: No        Breast feeding: Not Asked        Reaction to local anesthetic: No         Service: Not Asked        Blood Transfusions: Not Asked        Caffeine Concern: Yes          Coffee, soda, 2 cups daily        Occupational Exposure: Not Asked        Hobby Hazards: Not Asked        Sleep Concern: Not Asked        Stress Concern: Not Asked        Weight Concern: Not Asked        Special Diet: Not Asked        Back Care: Not Asked        Exercise: Not Asked        Bike Helmet: Not Asked        Seat Belt: Not Asked        Self-Exams: Not Asked    Social History Narrative      Not on file    Social Determinants of Health  Financial Resource Strain: Not on file  Food Insecurity: Not on file  Transportation Needs: Not on file  Physical Activity: Not on file  Stress: Not on file  Social Connections: Not on file  Housing Stability: Not on file  Family History   Problem Relation Age of Onset    Cancer Mother         Cancer-skin    Basal Cell Mother         skin cancer    Other (Other) Mother         Hearing loss/Pulmonary hypertension    Heart Disorder Brother     Cancer Brother         Cancer-bladder    Cancer Father         Cancer-throat    Dementia Maternal Grandmother         Alzheimer's     Stroke Maternal Grandfather     Cataracts Other     Dementia Maternal Aunt         alzheimer's    Heart Disease Brother     Heart Disease Paternal Aunt        There were no vitals filed for this visit. HPI:  Patient presents with:  Derm Problem: LOV 8/30/23. Pt's mother has hx of BCC. Pt has a spot on her left leg, was treated with cryo before but now it is back.  Pt has a new spot of concern behind her left ear for a week, red raised bump. Follow-up concerned with lesion leg, lesion chest, bump behind left ear    Scalp doing better history CC CP/LPP biopsy scarring [facility]ia at [facility] continues to use clindamycin doing okay  Nothing really new or different    Patient presents with concerns above. Patient has been in their usual state of health. Past notes/ records and appropriate/relevant lab results including pathology and past body maps reviewed. Including outside notes/ PCP notes as appropriate. Updated and new information noted in current visit. ROS:  Denies other relevant systemic complaints. History, medications, allergies reviewed as noted. Physical Examination:     Well-developed well-nourished patient alert oriented in no acute distress. Exam performed, including scalp, head, neck, face,nails, hair, external eyes, including conjunctival mucosa, eyelids, lips external ears , arms, digits,palms. Multiple light to medium brown, well marginated, uniformly pigmented, macules and papules 6 mm and less scattered on exam. pigmented lesions examined with dermoscopy benign-appearing patterns. Waxy tannish keratotic papules scattered, cherry-red vascular papules scattered. See map today's date for lesions noted . See assessment and plan below for specific lesions. Otherwise remarkable for lesions as noted on map. See A/P  below for additional information:    Assessment / plan:    No orders of the defined types were placed in this encounter. Meds & Refills for this Visit:  Requested Prescriptions      No prescriptions requested or ordered in this encounter         Inflamed seborrheic keratosis  (primary encounter diagnosis)  Seborrheic keratoses  Benign neoplasm of skin, unspecified location  Dermatitis    Lump at left postauricular crease lymph node likely related to previous inflammatory reactions, active inflammatory scarring alopecia on scalp. Seems better monitor over the next month or 2 follow-up with ENT if needed. Dermatitic changes resolved. These seem to flare early in mid summer. We will continue monitoring. Allergy evaluation at [facility] noncontributory    Lesions of concern chest lesion has resolved, lesion at left thigh inflamed SK retreated with cryo reassurance given    No other susupicious lesions on todays  exam.    Please refer to map for specific lesions. See additional diagnoses. Pros cons of various therapies, risks benefits discussed. Pathophysiology discussed with patient. Therapeutic options reviewed. See  Medications in grid. Instructions reviewed at length. Benign nevi, seborrheic  keratoses, cherry angiomas:  Reassurance regarding other benign skin lesions. Signs and symptoms of skin cancer, ABCDE's of melanoma discussed with patient. Sunscreen use, sun protection, self exams reviewed. Followup as noted RTC ---routine checkup    6 mos -one year or p.r.n. Encounter Times   Including precharting, reviewing chart, prior notes obtaining history: 10 minutes, medical exam :10 minutes, notes on body map, plan, counseling 10minutes My total time spent caring for the patient on the day of the encounter: 30 minutes     The patient indicates understanding of these issues and agrees to the plan. The patient is asked to return as noted in follow-up/ above. This note was generated using Dragon voice recognition software. Please contact me regarding any confusion resulting from errors in recognition. .  Note to patient and family: The [facility] makes medical notes like these available to patients. However, be advised this is a medical document. It is intended as [id number] communication and monitoring of a patient's care needs. It is written in medical language and may contain abbreviations or verbiage that are unfamiliar. It may appear blunt or direct.  Medical documents are intended to carry relevant information, facts as evident and the clinical opinion of the practitioner.

## 2023-11-21 ENCOUNTER — TELEPHONE (OUTPATIENT)
Dept: OTOLARYNGOLOGY | Facility: CLINIC | Age: 68
End: 2023-11-21

## 2023-11-21 NOTE — TELEPHONE ENCOUNTER
Pt calling states was told by MD that she can be placed on schedule for a Saturday last appt for procedure on her ear please advise

## 2023-12-01 NOTE — TELEPHONE ENCOUNTER
Patient called to make an appt in office procedure on Saturday. Schedule is not opened yet for 12/16/23. Informed patient that she will be scheduled on 12/16/23 at 11am for Local ear procedure.

## 2023-12-05 ENCOUNTER — PATIENT MESSAGE (OUTPATIENT)
Dept: OTOLARYNGOLOGY | Facility: CLINIC | Age: 68
End: 2023-12-05

## 2023-12-07 NOTE — TELEPHONE ENCOUNTER
From: Joaquin Haynes  To: [place]  Sent: 12/5/2023 11:00 AM CST  Subject: Is Insurance Precertification Required for December 16 Ear Lobe Procedure? Good Morning [facility] [facility] [employer]. Is Insurance precertification required for my December 16 ear lobe laceration repair? Thanks!  Viktor Hawley

## 2023-12-16 ENCOUNTER — OFFICE VISIT (OUTPATIENT)
Dept: OTOLARYNGOLOGY | Facility: CLINIC | Age: 68
End: 2023-12-16
Payer: MEDICARE

## 2023-12-16 DIAGNOSIS — S01.311D LACERATION OF RIGHT EAR LOBE, SUBSEQUENT ENCOUNTER: Primary | ICD-10-CM

## 2023-12-16 PROCEDURE — 12011 RPR F/E/E/N/L/M 2.5 CM/<: CPT | Performed by: OTOLARYNGOLOGY

## 2023-12-16 RX ORDER — HYOSCYAMINE SULFATE 0.12 MG/1
1 TABLET SUBLINGUAL EVERY 4 HOURS PRN
COMMUNITY
Start: 2023-12-11

## 2023-12-16 RX ORDER — CEPHALEXIN 500 MG/1
500 CAPSULE ORAL EVERY 8 HOURS
Qty: 21 CAPSULE | Refills: 0 | Status: SHIPPED | OUTPATIENT
Start: 2023-12-16

## 2023-12-16 NOTE — PROGRESS NOTES
Charlotte Nielsen is a 79year old female. Chief Complaint   Patient presents with    Ear Problem     Patient is here due to torn ear lobe right side. HISTORY OF PRESENT ILLNESS  She presents with 6-month history of issues with her ear. In the beginning she felt a pimple on her ear had some blood and pus come out. Since then she has had recurrent episodes of left-sided ear pain. On occasion she does feel that the canal somewhat narrowed. She does grind her teeth and has done so for a long time. Has fibromyalgia. Feels fullness weird sensations in her left ear. Some headaches temporarily and primarily on the left. Currently not wearing a bite guard and getting a new one and dealing with a cracked upper tooth on the right. No other signs, symptoms or complaints other than occasional minimal drainage which sounds like wax from the left. 7/28/23 recently had an illness and developed some dizziness and tinnitus. That has all resolved at this point and today has no complaints or concerns. Audiogram was performed today based on the symptoms with finding of complete normal hearing at this time. She also has concerns about lacerations to her ears where her piercings were able to go through the holes due to how large the piercings are at this point. 12/16/23 previous history of laceration on the right. Here for repair in the office.       Social History     Socioeconomic History    Marital status:    Tobacco Use    Smoking status: Never    Smokeless tobacco: Never   Vaping Use    Vaping Use: Never used   Substance and Sexual Activity    Alcohol use: Yes     Comment: Wine, 2 drinks occasionally    Drug use: Never   Other Topics Concern    Pt has a pacemaker No    Pt has a defibrillator No    Reaction to local anesthetic No    Caffeine Concern Yes     Comment: Coffee, soda, 2 cups daily       Family History   Problem Relation Age of Onset    Cancer Mother         Cancer-skin    Basal Cell Mother         skin cancer    Other (Other) Mother         Hearing loss/Pulmonary hypertension    Heart Disorder Brother     Cancer Brother         Cancer-bladder    Cancer Father         Cancer-throat    Dementia Maternal Grandmother         Alzheimer's     Stroke Maternal Grandfather     Cataracts Other     Dementia Maternal Aunt         alzheimer's    Heart Disease Brother     Heart Disease Paternal Aunt        Past Medical History:   Diagnosis Date    Atypical lobular hyperplasia of left breast     Esophageal reflux     Fibromyalgia     H/O measles     H/O mumps     History of chicken pox     IBS (irritable bowel syndrome)     Osteoporosis     Sinus disorder     Sinus problems       Past Surgical History:   Procedure Laterality Date      , 3/96    TUBAL LIGATION           REVIEW OF SYSTEMS    System Neg/Pos Details   Constitutional Negative Fatigue, fever and weight loss. ENMT Negative Drooling. Eyes Negative Blurred vision and vision changes. Respiratory Negative Dyspnea and wheezing. Cardio Negative Chest pain, irregular heartbeat/palpitations and syncope. GI Negative Abdominal pain and diarrhea. Endocrine Negative Cold intolerance and heat intolerance. Neuro Negative Tremors. Psych Negative Anxiety and depression. Integumentary Negative Frequent skin infections, pigment change and rash. Hema/Lymph Negative Easy bleeding and easy bruising. PHYSICAL EXAM    There were no vitals taken for this visit. Constitutional Normal Overall appearance - Normal.   Psychiatric Normal Orientation - Oriented to time, place, person & situation. Appropriate mood and affect.    Neck Exam Normal Inspection - Normal. Palpation - Normal. Parotid gland - Normal. Thyroid gland - Normal.   Eyes Normal Conjunctiva - Right: Normal, Left: Normal. Pupil - Right: Normal, Left: Normal. Fundus - Right: Normal, Left: Normal.   Neurological Normal Memory - Normal. Cranial nerves - Cranial nerves II through XII grossly intact. Head/Face Normal Facial features - Normal. Eyebrows - Normal. Skull - Normal.        Nasopharynx Normal External nose - Normal. Lips/teeth/gums - Normal. Tonsils - Normal. Oropharynx - Normal.   Ears Normal Inspection - Right: Normal, Left: Normal. Canal - Right: Normal, Left: Normal. TM - Right: Earlobe laceration, Left: Normal.   Skin Normal Inspection -right earlobe laceration        Lymph Detail Normal Submental. Submandibular. Anterior cervical. Posterior cervical. Supraclavicular. Nose/Mouth/Throat Normal External nose - Normal. Lips/teeth/gums - Normal. Tonsils - Normal. Oropharynx - Normal.   Nose/Mouth/Throat Normal Nares - Right: Normal Left: Normal. Septum -Normal  Turbinates - Right: Normal, Left: Normal.   Repair of right ear lobe laceration. Timeout was performed appropriate patient and site were identified and marked. The area in question of the right earlobe was infiltrated with 1% Xylocaine with 1-100,000 of epinephrine. Patient was prepped and draped in regular fashion. A #11 blade was used to remove a triangular portion of skin from the earlobe removing the entire lacerated portion of skin. The skin edges were then approximated everted and closed in a running locked fashion on the anterior and posterior surfaces for length of 2 cm. The wound was cleaned with peroxide followed by placement of a tape and benzoin dressing. No complications were noted        Current Outpatient Medications:     Hyoscyamine Sulfate SL 0.125 MG Sublingual SL Tab, Place 1 tablet under the tongue every 4 (four) hours as needed. , Disp: , Rfl:     cephalexin 500 MG Oral Cap, Take 1 capsule (500 mg total) by mouth every 8 (eight) hours. , Disp: 21 capsule, Rfl: 0    metRONIDAZOLE 0.75 % External Cream, Use bid to affected areas of face, Disp: 60 g, Rfl: 12    triamcinolone 0.1 % External Ointment, Applied 2 times per day as needed, Disp: 60 g, Rfl: 0    EPINEPHrine (EPIPEN 2-ELIZABETH) 0.3 MG/0.3ML Injection Solution Auto-injector, Inject IM in event of  allergic reaction, Disp: 1 each, Rfl: 0    Cholecalciferol (VITAMIN D3) 1000 units Oral Cap, Take 1 tablet by mouth daily. , Disp: , Rfl:     Vitamin B-12 100 MCG Oral Tab, Take 2.5 tablets (250 mcg total) by mouth daily. , Disp: , Rfl:     Calcium Carbonate 600 MG Oral Tab, Take  by mouth., Disp: , Rfl:   ASSESSMENT AND PLAN    1. Laceration of right ear lobe, subsequent encounter  Laceration repaired total length 2 cm. Start cephalexin for 7 days. Routine wound care discussed understood. Return to see me in the future if she wishes to address left-sided earlobe laceration. This note was prepared using "[employer] Salina Peck [employer] voice recognition dictation software. As a result errors may occur. When identified these errors have been corrected. While every attempt is made to correct errors during dictation discrepancies may still exist    Sukhi Chaparro MD    12/16/2023    4:10 PM

## 2023-12-18 ENCOUNTER — TELEPHONE (OUTPATIENT)
Dept: OTOLARYNGOLOGY | Facility: CLINIC | Age: 68
End: 2023-12-18

## 2023-12-18 NOTE — TELEPHONE ENCOUNTER
Fax received from [address] [place] [address] #[phone]  Pt call  Prescription sent to the wrong Pharmacy, Pt just seen in the office Saturday

## 2023-12-18 NOTE — TELEPHONE ENCOUNTER
Called patient to ask which pharmacy she wanted her medication to be sent to. Patient stated that she tried to call back stating that her previous message was a mistake. She received her medication from the right pharmacy by mistake, but she wants us to change the pharmacy to the right one in our system - [facility]). Patient also asked questions about wound care for the ear lobe about getting her dressing wet. Educated patient that dressing shouldn't get soaked by water for the first couple of days but will eventually fall off in the matter of a week. Had questions about timing for ear piercing again, and told patient to be patient, and let her ear lobe heal overtime before getting an ear piercing again. Patient understood and aware, no further questions.

## 2024-01-08 ENCOUNTER — OFFICE VISIT (OUTPATIENT)
Dept: DERMATOLOGY CLINIC | Facility: CLINIC | Age: 69
End: 2024-01-08
Payer: MEDICARE

## 2024-01-08 DIAGNOSIS — D18.01 CHERRY HEMANGIOMA: ICD-10-CM

## 2024-01-08 DIAGNOSIS — D23.9 BENIGN NEOPLASM OF SKIN, UNSPECIFIED LOCATION: ICD-10-CM

## 2024-01-08 DIAGNOSIS — L82.1 SEBORRHEIC KERATOSES: Primary | ICD-10-CM

## 2024-01-08 DIAGNOSIS — D22.9 MULTIPLE NEVI: ICD-10-CM

## 2024-01-08 DIAGNOSIS — L81.4 LENTIGO: ICD-10-CM

## 2024-01-08 PROCEDURE — 99213 OFFICE O/P EST LOW 20 MIN: CPT | Performed by: DERMATOLOGY

## 2024-01-08 RX ORDER — CETIRIZINE HYDROCHLORIDE 5 MG/1
5 TABLET ORAL DAILY
COMMUNITY

## 2024-01-10 NOTE — PROGRESS NOTES
Leslee Alonso is a 68 year old female.  HPI:     CC:    Chief Complaint   Patient presents with    Full Skin Exam     Hx of BCC.  LOV 2023.  Pt presents full body exam.  Lesion of concern to the left knee and right eyebrow, can be itchy.  Denies bleeding or pain.  Having joint pain currently.          Allergies:  Bee venom, Sulfa antibiotics, and Sulfanilamide    HISTORY:    Past Medical History:   Diagnosis Date    Atypical lobular hyperplasia of left breast     Esophageal reflux     Fibromyalgia     H/O measles     H/O mumps     History of chicken pox     IBS (irritable bowel syndrome)     Osteoporosis     Sinus disorder     Sinus problems      Past Surgical History:   Procedure Laterality Date      , 3/96    TUBAL LIGATION        Family History   Problem Relation Age of Onset    Cancer Mother         Cancer-skin    Basal Cell Mother         skin cancer    Other (Other) Mother         Hearing loss/Pulmonary hypertension    Heart Disorder Brother     Cancer Brother         Cancer-bladder    Cancer Father         Cancer-throat    Dementia Maternal Grandmother         Alzheimer's     Stroke Maternal Grandfather     Cataracts Other     Dementia Maternal Aunt         alzheimer's    Heart Disease Brother     Heart Disease Paternal Aunt       Social History     Socioeconomic History    Marital status:    Tobacco Use    Smoking status: Never    Smokeless tobacco: Never   Vaping Use    Vaping Use: Never used   Substance and Sexual Activity    Alcohol use: Yes     Comment: Wine, 2 drinks occasionally    Drug use: Never   Other Topics Concern    Grew up on a farm No    History of tanning Yes    Outdoor occupation No    Pt has a pacemaker No    Pt has a defibrillator No    Breast feeding No    Reaction to local anesthetic No    Caffeine Concern Yes     Comment: Coffee, soda, 2 cups daily        Current Outpatient Medications   Medication Sig Dispense Refill    cetirizine 5 MG Oral  Tab Take 1 tablet (5 mg total) by mouth daily.      Hyoscyamine Sulfate SL 0.125 MG Sublingual SL Tab Place 1 tablet under the tongue every 4 (four) hours as needed.      metRONIDAZOLE 0.75 % External Cream Use bid to affected areas of face 60 g 12    triamcinolone 0.1 % External Ointment Applied 2 times per day as needed 60 g 0    EPINEPHrine (EPIPEN 2-ELIZABETH) 0.3 MG/0.3ML Injection Solution Auto-injector Inject IM in event of  allergic reaction 1 each 0    Cholecalciferol (VITAMIN D3) 1000 units Oral Cap Take 1 tablet by mouth daily.      Vitamin B-12 100 MCG Oral Tab Take 2.5 tablets (250 mcg total) by mouth daily.      Calcium Carbonate 600 MG Oral Tab Take  by mouth.      cephalexin 500 MG Oral Cap Take 1 capsule (500 mg total) by mouth every 8 (eight) hours. (Patient not taking: Reported on 2024) 21 capsule 0     Allergies:   Allergies   Allergen Reactions    Bee Venom ANAPHYLAXIS    Sulfa Antibiotics HIVES, SHORTNESS OF BREATH and SWELLING     swelling      Sulfanilamide SHORTNESS OF BREATH       Past Medical History:   Diagnosis Date    Atypical lobular hyperplasia of left breast     Esophageal reflux     Fibromyalgia     H/O measles     H/O mumps     History of chicken pox     IBS (irritable bowel syndrome)     Osteoporosis     Sinus disorder     Sinus problems     Past Surgical History:   Procedure Laterality Date      , 3/96    TUBAL LIGATION       Social History     Socioeconomic History    Marital status:      Spouse name: Not on file    Number of children: Not on file    Years of education: Not on file    Highest education level: Not on file   Occupational History    Not on file   Tobacco Use    Smoking status: Never    Smokeless tobacco: Never   Vaping Use    Vaping Use: Never used   Substance and Sexual Activity    Alcohol use: Yes     Comment: Wine, 2 drinks occasionally    Drug use: Never    Sexual activity: Not on file   Other Topics Concern    Grew up on a farm No     History of tanning Yes    Outdoor occupation No    Pt has a pacemaker No    Pt has a defibrillator No    Breast feeding No    Reaction to local anesthetic No     Service Not Asked    Blood Transfusions Not Asked    Caffeine Concern Yes     Comment: Coffee, soda, 2 cups daily    Occupational Exposure Not Asked    Hobby Hazards Not Asked    Sleep Concern Not Asked    Stress Concern Not Asked    Weight Concern Not Asked    Special Diet Not Asked    Back Care Not Asked    Exercise Not Asked    Bike Helmet Not Asked    Seat Belt Not Asked    Self-Exams Not Asked   Social History Narrative    Not on file     Social Determinants of Health     Financial Resource Strain: Not on file   Food Insecurity: Not on file   Transportation Needs: Not on file   Physical Activity: Not on file   Stress: Not on file   Social Connections: Not on file   Housing Stability: Not on file     Family History   Problem Relation Age of Onset    Cancer Mother         Cancer-skin    Basal Cell Mother         skin cancer    Other (Other) Mother         Hearing loss/Pulmonary hypertension    Heart Disorder Brother     Cancer Brother         Cancer-bladder    Cancer Father         Cancer-throat    Dementia Maternal Grandmother         Alzheimer's     Stroke Maternal Grandfather     Cataracts Other     Dementia Maternal Aunt         alzheimer's    Heart Disease Brother     Heart Disease Paternal Aunt        There were no vitals filed for this visit.    HPI:  Chief Complaint   Patient presents with    Full Skin Exam     Hx of BCC.  LOV 9/2023.  Pt presents full body exam.  Lesion of concern to the left knee and right eyebrow, can be itchy.  Denies bleeding or pain.  Having joint pain currently.      Itchy lesions on face around the eyebrow, lesion on the post cryo has healed    Scalp doing better history CC CP/LPP biopsy scarring alopecia at [facility] continues to use clindamycin doing okay  Nothing really new or different    Patient presents  with concerns above.    Patient has been in their usual state of health.     Past notes/ records and appropriate/relevant lab results including pathology and past body maps reviewed. Including outside notes/ PCP notes as appropriate. Updated and new information noted in current visit.     ROS:  Denies other relevant systemic complaints.      History, medications, allergies reviewed as noted.       Physical Examination:     Well-developed well-nourished patient alert oriented in no acute distress.  Exam performed, including scalp, head, neck, face,nails, hair, external eyes, including conjunctival mucosa, eyelids, lips external ears , arms, digits,palms.     Multiple light to medium brown, well marginated, uniformly pigmented, macules and papules 6 mm and less scattered on exam. pigmented lesions examined with dermoscopy benign-appearing patterns.     Waxy tannish keratotic papules scattered, cherry-red vascular papules scattered.    See map today's date for lesions noted .  See assessment and plan below for specific lesions.    Otherwise remarkable for lesions as noted on map.    See A/P  below for additional information:    Assessment / plan:    No orders of the defined types were placed in this encounter.      Meds & Refills for this Visit:  Requested Prescriptions      No prescriptions requested or ordered in this encounter         Encounter Diagnoses   Name Primary?    Seborrheic keratoses Yes    Cherry hemangioma     Benign neoplasm of skin, unspecified location     Multiple nevi     Lentigo        Scalp fairly stable no active inflammatory lesions.  Significant irritation with topicals.  Will hold off on any of these currently has been very careful to avoid any shampoo or manipulation of the area which seems to have helped.  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  No obvious eczematous changes, scattered benign keratoses upper back shoulder, popliteal fossa with waxy tan papules right knee.  Lesion at anterior knee resolved post cryo  Continue monitor    Bring patient concern regarding repeated episodes of dermatitis over the summer will follow-up with immunology, will plan to schedule appointment around the time she usually flares see sooner.  Biopsy until then unlikely to be helpful as no active lesions.  Extensive workup has not been contributory.  Minimal itching presently resolves with moisturizers continue CeraVe a as needed    No other lesions of concern at this time    Dermatitic changes resolved.  These seem to flare early in mid summer.  We will continue monitoring.  Allergy evaluation at [facility] noncontributory    Lesions of concern chest lesion has resolved, lesion at left thigh inflamed SK retreated with cryo reassurance given    No other susupicious lesions on todays  exam.    Please refer to map for specific lesions.  See additional diagnoses.  Pros cons of various therapies, risks benefits discussed.Pathophysiology discussed with patient.  Therapeutic options reviewed.  See  Medications in grid.  Instructions reviewed at length.    Benign nevi, seborrheic  keratoses, cherry angiomas:  Reassurance regarding other benign skin lesions.Signs and symptoms of skin cancer, ABCDE's of melanoma discussed with patient. Sunscreen use, sun protection, self exams reviewed.  Followup as noted RTC ---routine checkup    6 mos -one year or p.r.n.    Encounter Times   Including precharting, reviewing chart, prior notes obtaining history: 10 minutes, medical exam :10 minutes, notes on body map, plan, counseling 10minutes My total time spent caring for the patient on the day of the encounter: 30 minutes     The patient indicates understanding of these issues and agrees to the plan.  The patient is asked to return as noted in follow-up/  above.    This note was generated using Dragon voice recognition software.  Please contact me regarding any confusion resulting from errors in recognition..  Note to patient and family: The 21st Century Cures Act makes medical notes like these available to patients. However, be advised this is a medical document. It is intended as [id number] communication and monitoring of a patient's care needs. It is written in medical language and may contain abbreviations or verbiage that are unfamiliar. It may appear blunt or direct. Medical documents are intended to carry relevant information, facts as evident and the clinical opinion of the practitioner.

## 2024-02-09 ENCOUNTER — TELEPHONE (OUTPATIENT)
Dept: OBGYN CLINIC | Facility: CLINIC | Age: 69
End: 2024-02-09

## 2024-02-09 ENCOUNTER — OFFICE VISIT (OUTPATIENT)
Dept: OBGYN CLINIC | Facility: CLINIC | Age: 69
End: 2024-02-09
Payer: MEDICARE

## 2024-02-09 VITALS
DIASTOLIC BLOOD PRESSURE: 79 MMHG | HEIGHT: 63 IN | SYSTOLIC BLOOD PRESSURE: 136 MMHG | WEIGHT: 163.38 LBS | BODY MASS INDEX: 28.95 KG/M2

## 2024-02-09 DIAGNOSIS — R21 SKIN RASH: ICD-10-CM

## 2024-02-09 DIAGNOSIS — Z01.419 ENCOUNTER FOR GYNECOLOGICAL EXAMINATION WITHOUT ABNORMAL FINDING: Primary | ICD-10-CM

## 2024-02-09 DIAGNOSIS — Z12.4 ENCOUNTER FOR PAPANICOLAOU SMEAR FOR CERVICAL CANCER SCREENING: ICD-10-CM

## 2024-02-09 DIAGNOSIS — R63.4 WEIGHT LOSS: Primary | ICD-10-CM

## 2024-02-09 DIAGNOSIS — N60.92 ATYPICAL LOBULAR HYPERPLASIA (ALH) OF LEFT BREAST: ICD-10-CM

## 2024-02-09 DIAGNOSIS — Z78.9 WEIGHT LOSS ADVISED: ICD-10-CM

## 2024-02-09 PROCEDURE — 99204 OFFICE O/P NEW MOD 45 MIN: CPT | Performed by: OBSTETRICS & GYNECOLOGY

## 2024-02-09 NOTE — TELEPHONE ENCOUNTER
[employer] message sent to pt regarding nutritionist referral and contact information..  Referral placed.

## 2024-02-09 NOTE — PROGRESS NOTES
HPI:   Leslee Alonso is a 68 year old female who presents for an annual/pap/  pt with hx of rash being evaluated by our dermatologist.  Pt also has medical issues with specialists at outside facility. Pt has a hx of atypical lobular hyperplasia of the breast and has been under the care of [facility].      Wt Readings from Last 6 Encounters:   24 163 lb 6.4 oz (74.1 kg)   22 147 lb (66.7 kg)   22 147 lb (66.7 kg)     Body mass index is 28.95 kg/m².     No results found for: \"CHOLEST\", \"HDL\", \"LDL\", \"TRIGLY\", \"AST\", \"ALT\"     Current Outpatient Medications   Medication Sig Dispense Refill    cetirizine 5 MG Oral Tab Take 1 tablet (5 mg total) by mouth daily.      Hyoscyamine Sulfate SL 0.125 MG Sublingual SL Tab Place 1 tablet under the tongue every 4 (four) hours as needed.      Cholecalciferol (VITAMIN D3) 1000 units Oral Cap Take 1 tablet by mouth daily.      Vitamin B-12 100 MCG Oral Tab Take 2.5 tablets (250 mcg total) by mouth daily.      Calcium Carbonate 600 MG Oral Tab Take  by mouth.      cephalexin 500 MG Oral Cap Take 1 capsule (500 mg total) by mouth every 8 (eight) hours. (Patient not taking: Reported on 2024) 21 capsule 0    metRONIDAZOLE 0.75 % External Cream Use bid to affected areas of face 60 g 12    triamcinolone 0.1 % External Ointment Applied 2 times per day as needed 60 g 0    EPINEPHrine (EPIPEN 2-ELIZABETH) 0.3 MG/0.3ML Injection Solution Auto-injector Inject IM in event of  allergic reaction 1 each 0      Past Medical History:   Diagnosis Date    Atypical lobular hyperplasia of left breast     Esophageal reflux     Fibromyalgia     H/O measles     H/O mumps     History of chicken pox     IBS (irritable bowel syndrome)     Osteoporosis     Sinus disorder     Sinus problems      Past Surgical History:   Procedure Laterality Date      , 3/96    TUBAL LIGATION        Family History   Problem Relation Age of Onset    Cancer Mother          Cancer-skin    Basal Cell Mother         skin cancer    Other (Other) Mother         Hearing loss/Pulmonary hypertension    Heart Disorder Brother     Cancer Brother         Cancer-bladder    Cancer Father         Cancer-throat    Dementia Maternal Grandmother         Alzheimer's     Stroke Maternal Grandfather     Cataracts Other     Dementia Maternal Aunt         alzheimer's    Heart Disease Brother     Heart Disease Paternal Aunt       Social History:   Social History     Socioeconomic History    Marital status:    Tobacco Use    Smoking status: Never    Smokeless tobacco: Never   Vaping Use    Vaping Use: Never used   Substance and Sexual Activity    Alcohol use: Yes     Comment: Wine, 2 drinks occasionally    Drug use: Never   Other Topics Concern    Grew up on a farm No    History of tanning Yes    Outdoor occupation No    Pt has a pacemaker No    Pt has a defibrillator No    Breast feeding No    Reaction to local anesthetic No    Caffeine Concern Yes     Comment: Coffee, soda, 2 cups daily            REVIEW OF SYSTEMS:   GENERAL: feels well otherwise  SKIN: denies any unusual skin lesions  EYES:denies blurred vision or double vision  HEENT: denies nasal congestion, sinus pain or ST  LUNGS: denies shortness of breath with exertion  CARDIOVASCULAR: denies chest pain on exertion  GI: denies abdominal pain,denies heartburn  : denies dysuria, vaginal discharge or itching,periods regular   MUSCULOSKELETAL: denies back pain  NEURO: denies headaches  PSYCHE: denies depression or anxiety  HEMATOLOGIC: denies hx of anemia  ENDOCRINE: denies thyroid history  ALL/ASTHMA: denies hx of allergy or asthma    EXAM:   /79   Ht 5' 3\" (1.6 m)   Wt 163 lb 6.4 oz (74.1 kg)   BMI 28.95 kg/m²   Body mass index is 28.95 kg/m².   GENERAL: well developed, well nourished,in no apparent distress  SKIN: no rashes,no suspicious lesions  HEENT: atraumatic, normocephalic  EYES:normal in appearance  NECK: supple,no  adenopathy  CHEST: no chest tenderness  BREAST: def pt .   LUNGS: clear to auscultation  CARDIO: RRR without murmur  GI: good BS's,no masses, HSM or tenderness  :introitus is normal,scant discharge,cervix is pink,no adnexal masses or tenderness, PAP was done     MUSCULOSKELETAL: back is not tender,FROM of the back  EXTREMITIES: no cyanosis, clubbing or edema  NEURO: Oriented times three      ASSESSMENT AND PLAN:   Leslee Alonso is a 68 year old female who presents for an annual/pap.  Pt noted she has been seeing specialists for her medical problems and is interested in a pcp and specialists at [facility],  pt given literature with our pcps and specialists.   Well woman counseling.  . Self breast exam explained. Health maintenance. Body mass index is 28.95 kg/m²., recommended low fat diet and aerobic exercise 30 minutes three times weekly.  The patient indicates understanding of these issues and agrees to the plan.  The patient is asked to return for an annual visit.  Seen and counseled and reviewed chart approx 45 min.

## 2024-02-09 NOTE — TELEPHONE ENCOUNTER
Pt saw ASJ today. He was supposed to give refer pt to a nutritionist, he gave her pamphlets but no nutritionist listed

## 2024-02-12 LAB — HPV I/H RISK 1 DNA SPEC QL NAA+PROBE: NEGATIVE

## 2024-04-30 ENCOUNTER — TELEPHONE (OUTPATIENT)
Dept: OTOLARYNGOLOGY | Facility: CLINIC | Age: 69
End: 2024-04-30

## 2024-04-30 ENCOUNTER — OFFICE VISIT (OUTPATIENT)
Dept: OTOLARYNGOLOGY | Facility: CLINIC | Age: 69
End: 2024-04-30
Payer: MEDICARE

## 2024-04-30 DIAGNOSIS — R05.3 CHRONIC COUGH: Primary | ICD-10-CM

## 2024-04-30 DIAGNOSIS — R05.3 CHRONIC COUGH: ICD-10-CM

## 2024-04-30 PROCEDURE — 31575 DIAGNOSTIC LARYNGOSCOPY: CPT | Performed by: OTOLARYNGOLOGY

## 2024-04-30 PROCEDURE — 99213 OFFICE O/P EST LOW 20 MIN: CPT | Performed by: OTOLARYNGOLOGY

## 2024-04-30 RX ORDER — OMEPRAZOLE 20 MG/1
20 CAPSULE, DELAYED RELEASE ORAL 2 TIMES DAILY
COMMUNITY
Start: 2024-03-08

## 2024-04-30 RX ORDER — AZELASTINE 1 MG/ML
2 SPRAY, METERED NASAL 2 TIMES DAILY
Qty: 30 ML | Refills: 0 | Status: SHIPPED | OUTPATIENT
Start: 2024-04-30

## 2024-04-30 RX ORDER — MONTELUKAST SODIUM 10 MG/1
10 TABLET ORAL NIGHTLY
Qty: 30 TABLET | Refills: 3 | Status: SHIPPED | OUTPATIENT
Start: 2024-04-30

## 2024-04-30 NOTE — TELEPHONE ENCOUNTER
This refill request is being sent to the provider for the following reason:  []Patient has not had an appointment within the past 12 months but has made an appointment on: ___  [x]Medication is not within protocol  []Patient did not complete follow up recommendations  [x]Other: Pharmacy declined first one due to it being OTC. Patient would like it dispensed through pharmacy.

## 2024-04-30 NOTE — PROGRESS NOTES
Leslee Alonso is a 68 year old female.    Chief Complaint   Patient presents with    Throat Problem     Patient is here due to chronic cough. Reports tightening in throat x 4 months.   Reports issues swallowing.        HISTORY OF PRESENT ILLNESS  She presents today with a chronic cough as well as sensation of tightening in her throat that is been going on for at least 4 months.  Feels that food passes but feels like it does not pass without some tightness.  Does not have to regurgitate food.  She does have a chronic cough which is nonproductive.  Some nasal congestion.  Specifically denies any reflux issues.  Currently on omeprazole 20 mg p.o. twice daily and denies any reflux issues at this time.      Social History     Socioeconomic History    Marital status:    Tobacco Use    Smoking status: Never    Smokeless tobacco: Never   Vaping Use    Vaping status: Never Used   Substance and Sexual Activity    Alcohol use: Yes     Comment: Wine, 2 drinks occasionally    Drug use: Never   Other Topics Concern    Grew up on a farm No    History of tanning Yes    Outdoor occupation No    Pt has a pacemaker No    Pt has a defibrillator No    Breast feeding No    Reaction to local anesthetic No    Caffeine Concern Yes     Comment: Coffee, soda, 2 cups daily       Family History   Problem Relation Age of Onset    Cancer Mother         Cancer-skin    Basal Cell Mother         skin cancer    Other (Other) Mother         Hearing loss/Pulmonary hypertension    Heart Disorder Brother     Cancer Brother         Cancer-bladder    Cancer Father         Cancer-throat    Dementia Maternal Grandmother         Alzheimer's     Stroke Maternal Grandfather     Cataracts Other     Dementia Maternal Aunt         alzheimer's    Heart Disease Brother     Heart Disease Paternal Aunt        Past Medical History:    Atypical lobular hyperplasia of left breast    Esophageal reflux    Fibromyalgia    H/O measles    H/O mumps     History of chicken pox    IBS (irritable bowel syndrome)    Osteoporosis    Sinus disorder    Sinus problems       Past Surgical History:   Procedure Laterality Date      , 3/96    Tubal ligation           REVIEW OF SYSTEMS    System Neg/Pos Details   Constitutional Negative Fatigue, fever and weight loss.   ENMT Negative Drooling.   Eyes Negative Blurred vision and vision changes.   Respiratory Negative Dyspnea and wheezing.   Cardio Negative Chest pain, irregular heartbeat/palpitations and syncope.   GI Negative Abdominal pain and diarrhea.   Endocrine Negative Cold intolerance and heat intolerance.   Neuro Negative Tremors.   Psych Negative Anxiety and depression.   Integumentary Negative Frequent skin infections, pigment change and rash.   Hema/Lymph Negative Easy bleeding and easy bruising.           PHYSICAL EXAM    There were no vitals taken for this visit.       Constitutional Normal Overall appearance - Normal.   Psychiatric Normal Orientation - Oriented to time, place, person & situation. Appropriate mood and affect.   Neck Exam Normal Inspection - Normal. Palpation - Normal. Parotid gland - Normal. Thyroid gland - Normal.   Eyes Normal Conjunctiva - Right: Normal, Left: Normal. Pupil - Right: Normal, Left: Normal. Fundus - Right: Normal, Left: Normal.   Neurological Normal Memory - Normal. Cranial nerves - Cranial nerves II through XII grossly intact.   Head/Face Normal Facial features - Normal. Eyebrows - Normal. Skull - Normal.        Nasopharynx Normal External nose - Normal. Lips/teeth/gums - Normal. Tonsils - Normal. Oropharynx - Normal.   Ears Normal Inspection - Right: Normal, Left: Normal. Canal - Right: Normal, Left: Normal. TM - Right: Normal, Left: Normal.   Skin Normal Inspection - Normal.        Lymph Detail Normal Submental. Submandibular. Anterior cervical. Posterior cervical. Supraclavicular.        Nose/Mouth/Throat Normal External nose - Normal. Lips/teeth/gums -  Normal. Tonsils - Normal. Oropharynx - Normal.   Nose/Mouth/Throat Normal Nares - Right: Normal Left: Normal. Septum -Normal  Turbinates - Right: Normal, Left: Normal.  The nasal mucosa bilaterally   Procedures:  Endoscopy/Laryngoscopy  Pre-Procedure Care: Verbal consent was obtained. Procedure/risks were explained. Questions were answered. Correct patient identified. Correct side and site confirmed.      A topical spray of ).25% Neosynephrine was sprayed into the nose.    Laryngoscopy:  Flexible Fiberoptic Laryngoscopy: A diagnostic flexible fiberoptic laryngoscopy was performed. The flexible fiberoptic laryngoscope was placed into the nose or mouthand advanced  into the interior of the larynx. A thorough examination of the interior of the larynx was performed.   Findings were as follows.       Hypopharynx/Larynx:  Epiglottis is normal.  Arytenoids:  Bilateral: Arytenoids are normal.  Vocal folds-false  Bilateral: Vocal folds (false) are normal.  Vocal folds-true  Bilateral: Vocal folds (true) are normal.  Pyriform sinus:  Bilateral: Pyriform sinuses are normal.  Base of tongue is normal in appearance.  There is no airway obstruction.   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  She does have a small cyst laterally at the level of the arytenoids on the left off of the pharynx.  Appears to be completely benign.              Current Outpatient Medications:     omeprazole 20 MG Oral Capsule Delayed Release, Take 1 capsule (20 mg total) by mouth 2 (two) times daily., Disp: , Rfl:     cetirizine 5 MG Oral Tab, Take 1 tablet (5 mg total) by mouth daily., Disp: , Rfl:     cephalexin 500 MG Oral Cap, Take 1 capsule (500 mg total) by mouth every 8 (eight) hours., Disp: 21 capsule, Rfl: 0    metRONIDAZOLE 0.75 % External Cream, Use bid to affected areas of face, Disp: 60 g, Rfl: 12    EPINEPHrine (EPIPEN 2-ELIZABETH) 0.3 MG/0.3ML Injection Solution  Auto-injector, Inject IM in event of  allergic reaction, Disp: 1 each, Rfl: 0    Cholecalciferol (VITAMIN D3) 1000 units Oral Cap, Take 1 tablet by mouth daily., Disp: , Rfl:     Vitamin B-12 100 MCG Oral Tab, Take 2.5 tablets (250 mcg total) by mouth daily., Disp: , Rfl:     Calcium Carbonate 600 MG Oral Tab, Take  by mouth., Disp: , Rfl:   ASSESSMENT AND PLAN    1. Chronic cough  Laryngoscopy reveals significant erythema of the laryngeal structures posteriorly including a small cyst at the level of the arytenoid off of the pharynx on the left.  Start Singulair Loratadine-D Astelin nasal spray continue with omeprazole twice daily return to see me in 1 month for reevaluation.  - LARYNGOSCOPY,FLEX FIBER,DIAGNOSTIC        This note was prepared using Dragon Medical voice recognition dictation software. As a result errors may occur. When identified these errors have been corrected. While every attempt is made to correct errors during dictation discrepancies may still exist    Sukhi Morris MD    4/30/2024    1:43 PM

## 2024-05-01 NOTE — TELEPHONE ENCOUNTER
Patient states the pharmacy still did not get the Claritin D prescription. Please do not call patient between 11:30AM - 1PM.

## 2024-05-02 ENCOUNTER — TELEPHONE (OUTPATIENT)
Dept: OTOLARYNGOLOGY | Facility: CLINIC | Age: 69
End: 2024-05-02

## 2024-05-02 NOTE — TELEPHONE ENCOUNTER
Patient called and asked why her Singulair is for 30 days when everything else is 90 days, she pays the same for 90 days as she does the 30 days, so would like that changed.  I left a VM for the [facility] asking them to change the Montelukast from 30 to a 90 day supply.

## 2024-05-02 NOTE — TELEPHONE ENCOUNTER
Per patient montelukast is supposed to be 90 day and was sent over for only 30 day. Please advise

## 2024-06-08 ENCOUNTER — OFFICE VISIT (OUTPATIENT)
Dept: OTOLARYNGOLOGY | Facility: CLINIC | Age: 69
End: 2024-06-08
Payer: MEDICARE

## 2024-06-08 VITALS — WEIGHT: 163 LBS | HEIGHT: 63 IN | BODY MASS INDEX: 28.88 KG/M2

## 2024-06-08 DIAGNOSIS — R05.3 CHRONIC COUGH: Primary | ICD-10-CM

## 2024-06-08 PROCEDURE — 99213 OFFICE O/P EST LOW 20 MIN: CPT | Performed by: OTOLARYNGOLOGY

## 2024-06-10 ENCOUNTER — TELEPHONE (OUTPATIENT)
Dept: OTOLARYNGOLOGY | Facility: CLINIC | Age: 69
End: 2024-06-10

## 2024-06-11 RX ORDER — AZELASTINE 1 MG/ML
2 SPRAY, METERED NASAL 2 TIMES DAILY
Qty: 30 ML | Refills: 0 | Status: SHIPPED | OUTPATIENT
Start: 2024-06-11

## 2024-07-15 ENCOUNTER — OFFICE VISIT (OUTPATIENT)
Dept: DERMATOLOGY CLINIC | Facility: CLINIC | Age: 69
End: 2024-07-15
Payer: MEDICARE

## 2024-07-15 DIAGNOSIS — D22.9 MULTIPLE NEVI: ICD-10-CM

## 2024-07-15 DIAGNOSIS — D18.01 CHERRY HEMANGIOMA: ICD-10-CM

## 2024-07-15 DIAGNOSIS — L82.1 SEBORRHEIC KERATOSES: ICD-10-CM

## 2024-07-15 DIAGNOSIS — D23.9 BENIGN NEOPLASM OF SKIN, UNSPECIFIED LOCATION: ICD-10-CM

## 2024-07-15 DIAGNOSIS — D48.5 NEOPLASM OF UNCERTAIN BEHAVIOR OF SKIN: Primary | ICD-10-CM

## 2024-07-15 DIAGNOSIS — L30.9 DERMATITIS: ICD-10-CM

## 2024-07-15 DIAGNOSIS — L81.4 LENTIGO: ICD-10-CM

## 2024-07-15 PROCEDURE — 88342 IMHCHEM/IMCYTCHM 1ST ANTB: CPT | Performed by: DERMATOLOGY

## 2024-07-15 PROCEDURE — 88341 IMHCHEM/IMCYTCHM EA ADD ANTB: CPT | Performed by: DERMATOLOGY

## 2024-07-15 PROCEDURE — 99213 OFFICE O/P EST LOW 20 MIN: CPT | Performed by: DERMATOLOGY

## 2024-07-15 PROCEDURE — 88305 TISSUE EXAM BY PATHOLOGIST: CPT | Performed by: DERMATOLOGY

## 2024-07-15 PROCEDURE — 11102 TANGNTL BX SKIN SINGLE LES: CPT | Performed by: DERMATOLOGY

## 2024-07-18 NOTE — PROGRESS NOTES
The pathology report from last visit showed     right elbow, shave biopsy:  -Dermatofibroma, traumatized.  Please log in test results, send biopsy results letter.  Pt to rtc 1 year or prn.

## 2024-07-22 NOTE — PROGRESS NOTES
Leslee Alonso is a 69 year old female.  HPI:     CC:    Chief Complaint   Patient presents with    Full Skin Exam     LOV 24. Pt presents for FBSE. Pt has bump on right upper arm for few months. Sight itching present.  Pt also has a brown spot on right upper thigh that's been there for 6 weeks that is changing colors.    Pt has personal Hx of BCC         Allergies:  Bee venom, Sulfa antibiotics, and Sulfanilamide    HISTORY:    Past Medical History:    Atypical lobular hyperplasia of left breast    Esophageal reflux    Fibromyalgia    H/O measles    H/O mumps    History of chicken pox    IBS (irritable bowel syndrome)    Osteoporosis    Sinus disorder    Sinus problems      Past Surgical History:   Procedure Laterality Date      , 3/96    Tubal ligation        Family History   Problem Relation Age of Onset    Cancer Mother         Cancer-skin    Basal Cell Mother         skin cancer    Other (Other) Mother         Hearing loss/Pulmonary hypertension    Heart Disorder Brother     Cancer Brother         Cancer-bladder    Cancer Father         Cancer-throat    Dementia Maternal Grandmother         Alzheimer's     Stroke Maternal Grandfather     Cataracts Other     Dementia Maternal Aunt         alzheimer's    Heart Disease Brother     Heart Disease Paternal Aunt       Social History     Socioeconomic History    Marital status:    Tobacco Use    Smoking status: Never    Smokeless tobacco: Never   Vaping Use    Vaping status: Never Used   Substance and Sexual Activity    Alcohol use: Yes     Comment: Wine, 2 drinks occasionally    Drug use: Never   Other Topics Concern    Grew up on a farm No    History of tanning Yes    Outdoor occupation No    Pt has a pacemaker No    Pt has a defibrillator No    Breast feeding No    Reaction to local anesthetic No    Caffeine Concern Yes     Comment: Coffee, soda, 2 cups daily     Social Determinants of Health     Financial Resource Strain:  Medium Risk (10/16/2022)    Received from [facility]    Overall Financial Resource Strain (CARDIA)     Difficulty of Paying Living Expenses: Somewhat hard   Food Insecurity: Low Risk  (10/4/2023)    Received from [facility]    Food Insecurity     Have there been times that your food ran out, and you didn't have money to get more?: No     Are there times that you worry that this might happen?: No   Transportation Needs: Low Risk  (10/4/2023)    Received from [facility]    Transportation Needs     Do you have trouble getting transportation to medical appointments?: No     How do you normally get to and from your appointments?: Family/Friend   Physical Activity: Sufficiently Active (10/16/2022)    Received from [facility]    Exercise Vital Sign     Days of Exercise per Week: 3 days     Minutes of Exercise per Session: 60 min   Stress: Stress Concern Present (10/16/2022)    Received from [facility]    [employer] - Occupational Stress Questionnaire     Feeling of Stress : Rather much   Social Connections: Moderately Integrated (10/16/2022)    Received from [facility]    Social Connection and Isolation Panel [NHANES]     Frequency of Communication with Friends and Family: More than three times a week     Frequency of Social Gatherings with Friends and Family: More than three times a week     Attends Restoration Services: 1 to 4 times per year     Active Member of Clubs or Organizations: No     Attends Club or Organization Meetings: Never     Marital Status:         Current Outpatient Medications   Medication Sig Dispense Refill    azelastine 0.1 % Nasal Solution 2 sprays by Nasal route 2 (two) times daily. 30 mL 0    omeprazole 20 MG Oral Capsule Delayed Release Take 1 capsule (20 mg total) by mouth 2 (two) times daily.       montelukast 10 MG Oral Tab Take 1 tablet (10 mg total) by mouth nightly. 30 tablet 3    loratadine-pseudoephedrine ER 5-120 MG Oral Tablet 12 Hr Take 1 tablet by mouth every 12 (twelve) hours. 60 tablet 3    cetirizine 5 MG Oral Tab Take 1 tablet (5 mg total) by mouth daily.      cephalexin 500 MG Oral Cap Take 1 capsule (500 mg total) by mouth every 8 (eight) hours. 21 capsule 0    metRONIDAZOLE 0.75 % External Cream Use bid to affected areas of face 60 g 12    EPINEPHrine (EPIPEN 2-ELIZABETH) 0.3 MG/0.3ML Injection Solution Auto-injector Inject IM in event of  allergic reaction 1 each 0    Cholecalciferol (VITAMIN D3) 1000 units Oral Cap Take 1 tablet by mouth daily.      Vitamin B-12 100 MCG Oral Tab Take 2.5 tablets (250 mcg total) by mouth daily.      Calcium Carbonate 600 MG Oral Tab Take  by mouth.       Allergies:   Allergies   Allergen Reactions    Bee Venom ANAPHYLAXIS    Sulfa Antibiotics HIVES, SHORTNESS OF BREATH and SWELLING     swelling      Sulfanilamide SHORTNESS OF BREATH       Past Medical History:    Atypical lobular hyperplasia of left breast    Esophageal reflux    Fibromyalgia    H/O measles    H/O mumps    History of chicken pox    IBS (irritable bowel syndrome)    Osteoporosis    Sinus disorder    Sinus problems     Past Surgical History:   Procedure Laterality Date      , 3/96    Tubal ligation       Social History     Socioeconomic History    Marital status:      Spouse name: Not on file    Number of children: Not on file    Years of education: Not on file    Highest education level: Not on file   Occupational History    Not on file   Tobacco Use    Smoking status: Never    Smokeless tobacco: Never   Vaping Use    Vaping status: Never Used   Substance and Sexual Activity    Alcohol use: Yes     Comment: Wine, 2 drinks occasionally    Drug use: Never    Sexual activity: Not on file   Other Topics Concern    Grew up on a farm No    History of tanning Yes    Outdoor  occupation No    Pt has a pacemaker No    Pt has a defibrillator No    Breast feeding No    Reaction to local anesthetic No     Service Not Asked    Blood Transfusions Not Asked    Caffeine Concern Yes     Comment: Coffee, soda, 2 cups daily    Occupational Exposure Not Asked    Hobby Hazards Not Asked    Sleep Concern Not Asked    Stress Concern Not Asked    Weight Concern Not Asked    Special Diet Not Asked    Back Care Not Asked    Exercise Not Asked    Bike Helmet Not Asked    Seat Belt Not Asked    Self-Exams Not Asked   Social History Narrative    Not on file     Social Determinants of Health     Financial Resource Strain: Medium Risk (10/16/2022)    Received from [facility]    Overall Financial Resource Strain (CARDIA)     Difficulty of Paying Living Expenses: Somewhat hard   Food Insecurity: Low Risk  (10/4/2023)    Received from [facility]    Food Insecurity     Have there been times that your food ran out, and you didn't have money to get more?: No     Are there times that you worry that this might happen?: No   Transportation Needs: Low Risk  (10/4/2023)    Received from [facility]    Transportation Needs     Do you have trouble getting transportation to medical appointments?: No     How do you normally get to and from your appointments?: Family/Friend   Physical Activity: Sufficiently Active (10/16/2022)    Received from [facility]    Exercise Vital Sign     Days of Exercise per Week: 3 days     Minutes of Exercise per Session: 60 min   Stress: Stress Concern Present (10/16/2022)    Received from [facility]    [employer] - Occupational Stress Questionnaire     Feeling of Stress : Rather much   Social Connections: Moderately Integrated (10/16/2022)    Received from [facility]    Social Connection and  Isolation Panel [NHANES]     Frequency of Communication with Friends and Family: More than three times a week     Frequency of Social Gatherings with Friends and Family: More than three times a week     Attends [employer] Services: 1 to 4 times per year     Active Member of Clubs or Organizations: No     Attends Club or Organization Meetings: Never     Marital Status:    Housing Stability: Not on file (10/4/2023)     Family History   Problem Relation Age of Onset    Cancer Mother         Cancer-skin    Basal Cell Mother         skin cancer    Other (Other) Mother         Hearing loss/Pulmonary hypertension    Heart Disorder Brother     Cancer Brother         Cancer-bladder    Cancer Father         Cancer-throat    Dementia Maternal Grandmother         Alzheimer's     Stroke Maternal Grandfather     Cataracts Other     Dementia Maternal Aunt         alzheimer's    Heart Disease Brother     Heart Disease Paternal Aunt        There were no vitals filed for this visit.    HPI:  Chief Complaint   Patient presents with    Full Skin Exam     LOV 1/8/24. Pt presents for FBSE. Pt has bump on right upper arm for few months. Sight itching present.  Pt also has a brown spot on right upper thigh that's been there for 6 weeks that is changing colors.    Pt has personal Hx of BCC   Lesion changing  Scalp doing better history CC CP/LPP biopsy scarring alopecia at [facility] continues to use clindamycin doing okay  Nothing really new or different    Patient presents with concerns above.    Patient has been in their usual state of health.     Past notes/ records and appropriate/relevant lab results including pathology and past body maps reviewed. Including outside notes/ PCP notes as appropriate. Updated and new information noted in current visit.     ROS:  Denies other relevant systemic complaints.      History, medications, allergies reviewed as noted.       Physical Examination:     Well-developed well-nourished patient  alert oriented in no acute distress.  Exam performed, including scalp, head, neck, face,nails, hair, external eyes, including conjunctival mucosa, eyelids, lips external ears , arms, digits,palms.     Multiple light to medium brown, well marginated, uniformly pigmented, macules and papules 6 mm and less scattered on exam. pigmented lesions examined with dermoscopy benign-appearing patterns.     Waxy tannish keratotic papules scattered, cherry-red vascular papules scattered.    See map today's date for lesions noted .  See assessment and plan below for specific lesions.    Otherwise remarkable for lesions as noted on map.    See A/P  below for additional information:    Assessment / plan:    Orders Placed This Encounter   Procedures    Specimen to Pathology, Tissue [[facility] Pt to [facility] lab]       Meds & Refills for this Visit:  Requested Prescriptions      No prescriptions requested or ordered in this encounter         Encounter Diagnoses   Name Primary?    Neoplasm of uncertain behavior of skin Yes    Seborrheic keratoses     Benign neoplasm of skin, unspecified location     Multiple nevi     Lentigo     Dermatitis     Cherry hemangioma           Right arm near lateral elbow with new papule 5mm red tan firm , r/o BCC vs other,   Shave/ tangential biopsy performed, operative note and consent in chart further plans pending pathology      Scalp fairly stable no active inflammatory lesions.  Significant irritation with topicals.  Will hold off on any of these currently has been very careful to avoid any shampoo or manipulation of the area which seems to have helped.  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  No obvious eczematous changes, scattered benign keratoses upper back shoulder, popliteal fossa with waxy tan papules right knee.   Lesion at anterior knee resolved post cryo  Continue monitor    Bring patient concern regarding repeated episodes of dermatitis over the summer will follow-up with immunology, will plan to schedule appointment around the time she usually flares see sooner.  Biopsy until then unlikely to be helpful as no active lesions.  Extensive workup has not been contributory.  Minimal itching presently resolves with moisturizers continue CeraVe a as needed    No other lesions of concern at this time    Dermatitis.  Patient seen Dr. Tijerina.  Has appointment later today.  Has been doing fairly well.  Will see if this does flare.  Will let us know how things are going.    Lesions of concern chest lesion has resolved, lesion at left thigh inflamed SK retreated with cryo reassurance given    No other susupicious lesions on todays  exam.    Please refer to map for specific lesions.  See additional diagnoses.  Pros cons of various therapies, risks benefits discussed.Pathophysiology discussed with patient.  Therapeutic options reviewed.  See  Medications in grid.  Instructions reviewed at length.    Benign nevi, seborrheic  keratoses, cherry angiomas:  Reassurance regarding other benign skin lesions.Signs and symptoms of skin cancer, ABCDE's of melanoma discussed with patient. Sunscreen use, sun protection, self exams reviewed.  Followup as noted RTC ---routine checkup    6 mos -one year or p.r.n.    Encounter Times   Including precharting, reviewing chart, prior notes obtaining history: 10 minutes, medical exam :10 minutes, notes on body map, plan, counseling 10minutes My total time spent caring for the patient on the day of the encounter: 30 minutes     The patient indicates understanding of these issues and agrees to the plan.  The patient is asked to return as noted in follow-up/ above.    This note was generated using Dragon voice recognition software.  Please contact me regarding any confusion resulting from errors in  recognition..  Note to patient and family: The 21st Century Cures Act makes medical notes like these available to patients. However, be advised this is a medical document. It is intended as [id number] communication and monitoring of a patient's care needs. It is written in medical language and may contain abbreviations or verbiage that are unfamiliar. It may appear blunt or direct. Medical documents are intended to carry relevant information, facts as evident and the clinical opinion of the practitioner.

## 2024-07-22 NOTE — PROGRESS NOTES
Operative Report                     Shave/  Tangential biopsy     Clinical diagnosis:    Size of lesion:    Location: Right arm near lateral elbow with new papule 5mm red tan firm , r/o BCC vs other,     Procedure:    With patient in appropriate position the skin of the above was scrubbed with alcohol.  Anesthesia was obtained with 1% Xylocaine with epinephrine.  The skin surrounding the lesion was placed under tension and the lesion was incised using a #15 scalpel blade.  The specimen was sent for histopathologic exam.    Hemostasis was obtained with electrocautery/aluminum chloride.  Estimated blood loss less than 2 cc.    Biopsy dressed with Polysporin, bandage.    Pressure dressing:   No    Complications: None    Written instructions given and reviewed with patient    Await pathology    Contact information reviewed.    Procedural physician:  Sujey Bailey MD

## 2024-08-07 RX ORDER — MONTELUKAST SODIUM 10 MG/1
10 TABLET ORAL NIGHTLY
Qty: 90 TABLET | Refills: 0 | Status: SHIPPED | OUTPATIENT
Start: 2024-08-07

## 2024-08-07 NOTE — TELEPHONE ENCOUNTER
Refill request sent to pharmacy on 8/7/24 for Montelukast, last OV on  6/8/24, patient requesting 90 day for insurance

## 2024-10-24 ENCOUNTER — OFFICE VISIT (OUTPATIENT)
Dept: OTOLARYNGOLOGY | Facility: CLINIC | Age: 69
End: 2024-10-24
Payer: MEDICARE

## 2024-10-24 ENCOUNTER — PATIENT MESSAGE (OUTPATIENT)
Dept: OTOLARYNGOLOGY | Facility: CLINIC | Age: 69
End: 2024-10-24

## 2024-10-24 DIAGNOSIS — R07.0 THROAT PAIN IN ADULT: Primary | ICD-10-CM

## 2024-10-24 PROCEDURE — 31575 DIAGNOSTIC LARYNGOSCOPY: CPT | Performed by: OTOLARYNGOLOGY

## 2024-10-24 PROCEDURE — 99213 OFFICE O/P EST LOW 20 MIN: CPT | Performed by: OTOLARYNGOLOGY

## 2024-10-24 RX ORDER — GLYCOPYRROLATE 1 MG/1
1 TABLET ORAL 3 TIMES DAILY
Qty: 90 TABLET | Refills: 1 | Status: SHIPPED | OUTPATIENT
Start: 2024-10-24

## 2024-10-24 RX ORDER — CHLORPHENIRAMINE MALEATE AND PHENYLEPHRINE HCL 4; 10 MG/1; MG/1
1 TABLET ORAL EVERY 4 HOURS PRN
COMMUNITY

## 2024-10-24 NOTE — PROGRESS NOTES
Leslee Alonso is a 69 year old female.    Chief Complaint   Patient presents with    Throat Problem     Choking sensation x6 months  Pt reports sensation has progressively worsened         HISTORY OF PRESENT ILLNESS  She presents today with a chronic cough as well as sensation of tightening in her throat that is been going on for at least 4 months. Feels that food passes but feels like it does not pass without some tightness. Does not have to regurgitate food. She does have a chronic cough which is nonproductive. Some nasal congestion. Specifically denies any reflux issues. Currently on omeprazole 20 mg p.o. twice daily and denies any reflux issues at this time.      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  She was started on Singulair Loratadine-D Astelin nasal spray and omeprazole 20 mg increased to twice daily.  Complete resolution of her chronic cough but still continues to have sensation of tightness and phlegm in her throat.  Stopped the medications about 8 days ago when the first month the meds was completed has not gotten refills.  No other signs, symptoms or complaints.    10/24/24 I last saw her in June of this year and started her on Claritin-D Singulair and Astelin.  She has been using omeprazole twice daily for GERD and feels that her GERD symptoms are reasonably well-controlled.  She is planning on having an EGD in the near future.  Will be following up with a GI doctor at some point.  States that she feels a choking sensation especially when she lays back to go to sleep feels like there is a scarf wrapped around her neck and being pulled backwards.  No voice changes some cough some throat clearing.  Denies any significant postnasal discharge.  Currently no longer using her medications.  Use them for about 30 to 45 days did not really note any improvement in her symptoms of chronic cough previously.  Cough resolved on its own by sometime in July or August.   States that she did well up until late September when she started noticing the sensation of fullness and tightness and pressure in her neck and throat.  Previously noted to have a small cyst on the area epiglottic fold on the left which was nonobstructing and did not impinge upon the airway or the introitus of the esophagus.  Able to swallow able to eat without difficulty no weight loss.    Social History     Socioeconomic History    Marital status:    Tobacco Use    Smoking status: Never    Smokeless tobacco: Never   Vaping Use    Vaping status: Never Used   Substance and Sexual Activity    Alcohol use: Yes     Comment: Wine, 2 drinks occasionally    Drug use: Never   Other Topics Concern    Grew up on a farm No    History of tanning Yes    Outdoor occupation No    Pt has a pacemaker No    Pt has a defibrillator No    Breast feeding No    Reaction to local anesthetic No    Caffeine Concern Yes     Comment: Coffee, soda, 2 cups daily       Family History   Problem Relation Age of Onset    Cancer Mother         Cancer-skin    Basal Cell Mother         skin cancer    Other (Other) Mother         Hearing loss/Pulmonary hypertension    Heart Disorder Brother     Cancer Brother         Cancer-bladder    Cancer Father         Cancer-throat    Dementia Maternal Grandmother         Alzheimer's     Stroke Maternal Grandfather     Cataracts Other     Dementia Maternal Aunt         alzheimer's    Heart Disease Brother     Heart Disease Paternal Aunt        Past Medical History:    Atypical lobular hyperplasia of left breast    Esophageal reflux    Fibromyalgia    H/O measles    H/O mumps    History of chicken pox    IBS (irritable bowel syndrome)    Osteoporosis    Sinus disorder    Sinus problems       Past Surgical History:   Procedure Laterality Date      , 3/96    Tubal ligation           REVIEW OF SYSTEMS    System Neg/Pos Details   Constitutional Negative Fatigue, fever and weight loss.   ENMT  Negative Drooling.   Eyes Negative Blurred vision and vision changes.   Respiratory Negative Dyspnea and wheezing.   Cardio Negative Chest pain, irregular heartbeat/palpitations and syncope.   GI Negative Abdominal pain and diarrhea.   Endocrine Negative Cold intolerance and heat intolerance.   Neuro Negative Tremors.   Psych Negative Anxiety and depression.   Integumentary Negative Frequent skin infections, pigment change and rash.   Hema/Lymph Negative Easy bleeding and easy bruising.           PHYSICAL EXAM    There were no vitals taken for this visit.       Constitutional Normal Overall appearance - Normal.   Psychiatric Normal Orientation - Oriented to time, place, person & situation. Appropriate mood and affect.   Neck Exam Normal Inspection - Normal. Palpation - Normal. Parotid gland - Normal. Thyroid gland - Normal.   Eyes Normal Conjunctiva - Right: Normal, Left: Normal. Pupil - Right: Normal, Left: Normal. Fundus - Right: Normal, Left: Normal.   Neurological Normal Memory - Normal. Cranial nerves - Cranial nerves II through XII grossly intact.   Head/Face Normal Facial features - Normal. Eyebrows - Normal. Skull - Normal.        Nasopharynx Normal External nose - Normal. Lips/teeth/gums - Normal. Tonsils - Normal. Oropharynx - Normal.   Ears Normal Inspection - Right: Normal, Left: Normal. Canal - Right: Normal, Left: Normal. TM - Right: Normal, Left: Normal.   Skin Normal Inspection - Normal.        Lymph Detail Normal Submental. Submandibular. Anterior cervical. Posterior cervical. Supraclavicular.        Nose/Mouth/Throat Normal External nose - Normal. Lips/teeth/gums - Normal. Tonsils - Normal. Oropharynx - Normal.   Nose/Mouth/Throat Normal Nares - Right: Normal Left: Normal. Septum -Normal  Turbinates - Right: Normal, Left: Normal.   Procedures:  Endoscopy/Laryngoscopy  Pre-Procedure Care: Verbal consent was obtained. Procedure/risks were explained. Questions were answered. Correct patient  identified. Correct side and site confirmed.      A topical spray of ).25% Neosynephrine was sprayed into the nose.    Laryngoscopy:  Flexible Fiberoptic Laryngoscopy: A diagnostic flexible fiberoptic laryngoscopy was performed. The flexible fiberoptic laryngoscope was placed into the nose or mouthand advanced  into the interior of the larynx. A thorough examination of the interior of the larynx was performed.   Findings were as follows.       Hypopharynx/Larynx:  Epiglottis is normal.  Arytenoids:  Bilateral: Arytenoids are normal.  Vocal folds-false  Bilateral: Vocal folds (false) are normal.  Vocal folds-true  Bilateral: Vocal folds (true) are normal.  Pyriform sinus:  Bilateral: Pyriform sinuses are normal.  Base of tongue is normal in appearance.  There is no airway obstruction.   General comments: Significant erythema of the arytenoids and the introitus of the esophagus.  1 cm or smaller cystic lesion of the area epiglottic fold on the left no airway obstruction or obstruction of the introitus of the esophagus.  Significant postnasal discharge noted.  No other lesions masses polyps nodules or other abnormalities noted of the larynx              Current Outpatient Medications:     montelukast 10 MG Oral Tab, Take 1 tablet (10 mg total) by mouth nightly., Disp: 90 tablet, Rfl: 0    azelastine 0.1 % Nasal Solution, 2 sprays by Nasal route 2 (two) times daily., Disp: 30 mL, Rfl: 0    omeprazole 20 MG Oral Capsule Delayed Release, Take 1 capsule (20 mg total) by mouth 2 (two) times daily., Disp: , Rfl:     loratadine-pseudoephedrine ER 5-120 MG Oral Tablet 12 Hr, Take 1 tablet by mouth every 12 (twelve) hours., Disp: 60 tablet, Rfl: 3    cetirizine 5 MG Oral Tab, Take 1 tablet (5 mg total) by mouth daily., Disp: , Rfl:     cephalexin 500 MG Oral Cap, Take 1 capsule (500 mg total) by mouth every 8 (eight) hours., Disp: 21 capsule, Rfl: 0    metRONIDAZOLE 0.75 % External Cream, Use bid to affected areas of face,  Disp: 60 g, Rfl: 12    EPINEPHrine (EPIPEN 2-ELIZABETH) 0.3 MG/0.3ML Injection Solution Auto-injector, Inject IM in event of  allergic reaction, Disp: 1 each, Rfl: 0    Cholecalciferol (VITAMIN D3) 1000 units Oral Cap, Take 1 tablet by mouth daily., Disp: , Rfl:     Vitamin B-12 100 MCG Oral Tab, Take 2.5 tablets (250 mcg total) by mouth daily., Disp: , Rfl:     Calcium Carbonate 600 MG Oral Tab, Take  by mouth., Disp: , Rfl:   ASSESSMENT AND PLAN    1. Throat pain in adult  Very congested nasal mucosa.  Laryngoscopy does reveal postnasal discharge with significant erythema of the laryngeal structures posteriorly specifically the arytenoids aryepiglottic folds as well as the antritis esophagus.  No lesions or abnormalities of the vocal cords themselves.  I did discuss with her that despite her use of omeprazole 20 mg twice daily she may be having LPR symptoms from silent reflux.  She does sleep sitting up.  At this time I did ask her to continue with the use of her PPI and as she felt no real improvement in her symptoms with the use of allergy meds in the past but does admit to some postnasal drip I did recommend that she trial glycopyrrolate 1 mg p.o. 3 times daily and she will decrease this to half a tab 3 times daily if she notes significant over dryness.  Return to see me in 1 month for reevaluation.  I did discuss with her the importance of having an EGD performed at some point as her last EGD was performed about 5 years ago and I do suspect that she may have persistent acid reflux and would benefit from having her esophagus and stomach reexamined.        This note was prepared using [employer] voice recognition dictation software. As a result errors may occur. When identified these errors have been corrected. While every attempt is made to correct errors during dictation discrepancies may still exist    Sukhi Morris MD    10/24/2024    9:42 AM

## 2025-03-21 ENCOUNTER — OFFICE VISIT (OUTPATIENT)
Dept: DERMATOLOGY CLINIC | Facility: CLINIC | Age: 70
End: 2025-03-21
Payer: MEDICARE

## 2025-03-21 DIAGNOSIS — D23.9 DERMATOFIBROMA: Primary | ICD-10-CM

## 2025-03-21 DIAGNOSIS — D18.01 CHERRY HEMANGIOMA: ICD-10-CM

## 2025-03-21 DIAGNOSIS — T14.8XXA FOREIGN BODY/SPLINTER, SKIN: ICD-10-CM

## 2025-03-21 DIAGNOSIS — D23.9 BENIGN NEOPLASM OF SKIN, UNSPECIFIED LOCATION: ICD-10-CM

## 2025-03-21 DIAGNOSIS — D22.9 MULTIPLE NEVI: ICD-10-CM

## 2025-03-21 DIAGNOSIS — L82.1 SEBORRHEIC KERATOSES: ICD-10-CM

## 2025-03-21 DIAGNOSIS — L81.4 LENTIGO: ICD-10-CM

## 2025-03-21 PROCEDURE — 99213 OFFICE O/P EST LOW 20 MIN: CPT | Performed by: DERMATOLOGY

## 2025-03-21 RX ORDER — OMEPRAZOLE 40 MG/1
40 CAPSULE, DELAYED RELEASE ORAL DAILY
COMMUNITY
Start: 2025-02-08

## 2025-03-21 NOTE — PROGRESS NOTES
The following individual(s) verbally consented to be recorded using ambient AI listening technology and understand that they can each withdraw their consent to this listening technology at any point by asking the clinician to turn off or pause the recording:    Patient name: Leslee MANDY Alonso  Additional names:  Sina Alonso

## 2025-03-30 NOTE — PROGRESS NOTES
Leslee Alonso is a 69 year old female.  HPI:     CC:    Chief Complaint   Patient presents with    Full Skin Exam     LOV 2024. Pt present for full body skin exam. Would like assessment of lesion on r upper arm that has been previously tx w/ LN2.          Allergies:  Bee venom, Sulfa antibiotics, and Sulfanilamide    HISTORY:    Past Medical History:    Atypical lobular hyperplasia of left breast    Esophageal reflux    Fibromyalgia    H/O measles    H/O mumps    History of chicken pox    IBS (irritable bowel syndrome)    Osteoporosis    Sinus disorder    Sinus problems      Past Surgical History:   Procedure Laterality Date      , 3/96    Tubal ligation        Family History   Problem Relation Age of Onset    Cancer Mother         Cancer-skin    Basal Cell Mother         skin cancer    Other (Other) Mother         Hearing loss/Pulmonary hypertension    Heart Disorder Brother     Cancer Brother         Cancer-bladder    Cancer Father         Cancer-throat    Dementia Maternal Grandmother         Alzheimer's     Stroke Maternal Grandfather     Cataracts Other     Dementia Maternal Aunt         alzheimer's    Heart Disease Brother     Heart Disease Paternal Aunt       Social History     Socioeconomic History    Marital status:    Tobacco Use    Smoking status: Never    Smokeless tobacco: Never   Vaping Use    Vaping status: Never Used   Substance and Sexual Activity    Alcohol use: Yes     Comment: Wine, 2 drinks occasionally    Drug use: Never   Other Topics Concern    Grew up on a farm No    History of tanning Yes    Outdoor occupation No    Pt has a pacemaker No    Pt has a defibrillator No    Breast feeding No    Reaction to local anesthetic No    Caffeine Concern Yes     Comment: Coffee, soda, 2 cups daily     Social Drivers of Health     Food Insecurity: Low Risk  (10/4/2023)    [facility], [facility]    [place]  Insecurity     Have there been times that your food ran out, and you didn't have money to get more?: No     Are there times that you worry that this might happen?: No   Transportation Needs: Low Risk  (10/4/2023)    Received from [facility], [facility]    Transportation Needs     Do you have trouble getting transportation to medical appointments?: No     How do you normally get to and from your appointments?: Family/Friend   Stress: Stress Concern Present (10/16/2022)    [facility] - Occupational Stress Questionnaire     Feeling of Stress : Rather much        Current Outpatient Medications   Medication Sig Dispense Refill    Omeprazole 40 MG Oral Capsule Delayed Release Take 1 capsule (40 mg total) by mouth daily.      cetirizine 5 MG Oral Tab Take 1 tablet (5 mg total) by mouth daily.      EPINEPHrine (EPIPEN 2-ELIZABETH) 0.3 MG/0.3ML Injection Solution Auto-injector Inject IM in event of  allergic reaction 1 each 0    Cholecalciferol (VITAMIN D3) 1000 units Oral Cap Take 1 tablet by mouth daily.      Vitamin B-12 100 MCG Oral Tab Take 2.5 tablets (250 mcg total) by mouth daily.      omeprazole 20 MG Oral Capsule Delayed Release Take 1 capsule (20 mg total) by mouth 2 (two) times daily. (Patient not taking: Reported on 3/21/2025)      Calcium Carbonate 600 MG Oral Tab Take  by mouth. (Patient not taking: Reported on 3/21/2025)       Allergies:   Allergies   Allergen Reactions    Bee Venom ANAPHYLAXIS    Sulfa Antibiotics HIVES, SHORTNESS OF BREATH and SWELLING     swelling      Sulfanilamide SHORTNESS OF BREATH       Past Medical History:    Atypical lobular hyperplasia of left breast    Esophageal reflux    Fibromyalgia    H/O measles    H/O mumps    History of chicken pox    IBS (irritable bowel syndrome)    Osteoporosis    Sinus disorder    Sinus problems     Past Surgical History:   Procedure Laterality  Date      , 3/96    Tubal ligation       Social History     Socioeconomic History    Marital status:      Spouse name: Not on file    Number of children: Not on file    Years of education: Not on file    Highest education level: Not on file   Occupational History    Not on file   Tobacco Use    Smoking status: Never    Smokeless tobacco: Never   Vaping Use    Vaping status: Never Used   Substance and Sexual Activity    Alcohol use: Yes     Comment: Wine, 2 drinks occasionally    Drug use: Never    Sexual activity: Not on file   Other Topics Concern    Grew up on a farm No    History of tanning Yes    Outdoor occupation No    Pt has a pacemaker No    Pt has a defibrillator No    Breast feeding No    Reaction to local anesthetic No     Service Not Asked    Blood Transfusions Not Asked    Caffeine Concern Yes     Comment: Coffee, soda, 2 cups daily    Occupational Exposure Not Asked    Hobby Hazards Not Asked    Sleep Concern Not Asked    Stress Concern Not Asked    Weight Concern Not Asked    Special Diet Not Asked    Back Care Not Asked    Exercise Not Asked    Bike Helmet Not Asked    Seat Belt Not Asked    Self-Exams Not Asked   Social History Narrative    Not on file     Social Drivers of Health     Food Insecurity: Low Risk  (10/4/2023)    Received from [facility]    Food Insecurity     Have there been times that your food ran out, and you didn't have money to get more?: No     Are there times that you worry that this might happen?: No   Transportation Needs: Low Risk  (10/4/2023)    Received from [facility]    Transportation Needs     Do you have trouble getting transportation to medical appointments?: No     How do you normally get to and from your appointments?: Family/Friend   Stress: Stress Concern Present (10/16/2022)    Received from [facility], [facility]     [facility] - Occupational Stress Questionnaire     Feeling of Stress : Rather much   Housing Stability: Not on file (10/4/2023)     Family History   Problem Relation Age of Onset    Cancer Mother         Cancer-skin    Basal Cell Mother         skin cancer    Other (Other) Mother         Hearing loss/Pulmonary hypertension    Heart Disorder Brother     Cancer Brother         Cancer-bladder    Cancer Father         Cancer-throat    Dementia Maternal Grandmother         Alzheimer's     Stroke Maternal Grandfather     Cataracts Other     Dementia Maternal Aunt         alzheimer's    Heart Disease Brother     Heart Disease Paternal Aunt        There were no vitals filed for this visit.    HPI:  Chief Complaint   Patient presents with    Full Skin Exam     LOV 7/2024. Pt present for full body skin exam. Would like assessment of lesion on r upper arm that has been previously tx w/ LN2.          History of Present Illness  Leslee Alonso is a 69 year old female who presents for follow-up of a lesion on the right upper arm and other dermatological concerns.    She has a lesion on her right upper arm, described as a small bump, consistent with a dermatofibroma. She is currently monitoring the lesion and has not pursued additional treatment.    She has noted several lentigines over her face and scattered papules on her arms and legs. No atypical lesions or new suspicious lesions on her back have been reported.    She had a splinter in her right palmar thumb, which had been present for about a month. It was associated with a keratotic area and hyperpigmentation. The splinter was approximately one inch by one millimeter and was removed, relieving her discomfort. There was no evidence of infection.    She reports tenderness in her left foot after stepping on glass. She has seen a podiatrist and had several x-rays, but exploration did not lead to improvement. She continues to monitor the  area and reports no other suspicious lesions.    She continues her current skincare regimen and medications.      Scalp doing better history CC CP/LPP biopsy scarring alopecia at [facility] continues to use clindamycin doing okay  Nothing really new or different    Patient presents with concerns above.    Patient has been in their usual state of health.     Past notes/ records and appropriate/relevant lab results including pathology and past body maps reviewed. Including outside notes/ PCP notes as appropriate. Updated and new information noted in current visit.     ROS:  Denies other relevant systemic complaints.      History, medications, allergies reviewed as noted.       Physical Examination:     Well-developed well-nourished patient alert oriented in no acute distress.  Exam performed, including scalp, head, neck, face,nails, hair, external eyes, including conjunctival mucosa, eyelids, lips external ears , arms, digits,palms.     Multiple light to medium brown, well marginated, uniformly pigmented, macules and papules 6 mm and less scattered on exam. pigmented lesions examined with dermoscopy benign-appearing patterns.     Waxy tannish keratotic papules scattered, cherry-red vascular papules scattered.    See map today's date for lesions noted .  See assessment and plan below for specific lesions.    Otherwise remarkable for lesions as noted on map.    See A/P  below for additional information:    Assessment / plan:    No orders of the defined types were placed in this encounter.      Meds & Refills for this Visit:  Requested Prescriptions      No prescriptions requested or ordered in this encounter         Encounter Diagnoses   Name Primary?    Dermatofibroma Yes    Seborrheic keratoses     Benign neoplasm of skin, unspecified location     Lentigo     Multiple nevi     Cherry hemangioma     Foreign body/splinter, skin          Physical Exam  SKIN: Dermatofibroma on right upper arm. Back without new suspicious  lesions. Splinter in right palmar thumb with keratotic area and hyperpigmentation, splinter removed. No evidence of infection in right thumb. No obvious punctum in left foot. No other suspicious lesions.    Results  Procedure: Splinter Removal  Description: Right palmar thumb keratotic area noted with hyperpigmentation.  Area cleansed with alcohol using a #15 blade, a splinter approximately one inch by one millimeter was removed, relieving discomfort. No evidence of infection.    RADIOLOGY  X-rays: No significant findings.    Assessment & Plan  Dermatofibroma  Small bump on the right upper arm consistent with dermatofibroma. No atypical features present.  - Monitor the lesion on the right upper arm.    Lentigines  Several lentigines on the face without atypical lesions.  - Continue current skincare regimen.    Splinter in right thumb  Splinter in the right palmar thumb causing discomfort. Keratotic area with hyperpigmentation. Splinter approximately one inch by one millimeter removed with a fifteen blade, relieving discomfort. No infection present.  - Monitor the area on the right thumb for signs of infection.    Plantar tenderness  Tenderness in the left foot post-glass injury. Previous podiatry consultation and x-rays performed. Exploration without improvement. No punctum or suspicious lesions present.  - Monitor the left foot for changes or improvement.    Post-otoplasty follow-up  -post-otoplasty healed well plans to repierce information given for piercing          Dermatofibroma on biopsy 7/24 small residual remains reassurance.  Recommend monitoring presently given the size and location      Scalp fairly stable no active inflammatory lesions.  Significant irritation with topicals.  Will hold off on any of these currently has been very careful to avoid any shampoo or manipulation of the area which seems to have helped.  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  No obvious eczematous changes, scattered benign keratoses upper back shoulder, popliteal fossa with waxy tan papules right knee.  Lesion at anterior knee resolved post cryo  Continue monitor    Bring patient concern regarding repeated episodes of dermatitis over the summer will follow-up with immunology, will plan to schedule appointment around the time she usually flares see sooner.  Biopsy until then unlikely to be helpful as no active lesions.  Extensive workup has not been contributory.  Minimal itching presently resolves with moisturizers continue CeraVe a as needed    No other lesions of concern at this time    Dermatitis.  Patient seen Dr. Tijerina.  Has appointment later today.  Has been doing fairly well.  Will see if this does flare.  Will let us know how things are going.    Lesions of concern chest lesion has resolved, lesion at left thigh inflamed SK retreated with cryo reassurance given    No other susupicious lesions on todays  exam.    Please refer to map for specific lesions.  See additional diagnoses.  Pros cons of various therapies, risks benefits discussed.Pathophysiology discussed with patient.  Therapeutic options reviewed.  See  Medications in grid.  Instructions reviewed at length.    Benign nevi, seborrheic  keratoses, cherry angiomas:  Reassurance regarding other benign skin lesions.Signs and symptoms of skin cancer, ABCDE's of melanoma discussed with patient. Sunscreen use, sun protection, self exams reviewed.  Followup as noted RTC ---routine checkup    6 mos -one year or p.r.n.    Encounter Times   Including precharting, reviewing chart, prior notes obtaining history: 10 minutes, medical exam :10 minutes, notes on body map, plan, counseling 10minutes My total time spent caring for the patient on the day of the encounter: 30 minutes     The  patient indicates understanding of these issues and agrees to the plan.  The patient is asked to return as noted in follow-up/ above.    This note was generated using Dragon voice recognition software.  Please contact me regarding any confusion resulting from errors in recognition..  Note to patient and family: The 21st Century Cures Act makes medical notes like these available to patients. However, be advised this is a medical document. It is intended as [id number] communication and monitoring of a patient's care needs. It is written in medical language and may contain abbreviations or verbiage that are unfamiliar. It may appear blunt or direct. Medical documents are intended to carry relevant information, facts as evident and the clinical opinion of the practitioner.

## 2025-04-28 ENCOUNTER — TELEPHONE (OUTPATIENT)
Dept: DERMATOLOGY CLINIC | Facility: CLINIC | Age: 70
End: 2025-04-28

## 2025-05-16 ENCOUNTER — OFFICE VISIT (OUTPATIENT)
Dept: DERMATOLOGY CLINIC | Facility: CLINIC | Age: 70
End: 2025-05-16

## 2025-05-16 DIAGNOSIS — D23.9 BENIGN NEOPLASM OF SKIN, UNSPECIFIED LOCATION: ICD-10-CM

## 2025-05-16 DIAGNOSIS — L30.9 DERMATITIS: ICD-10-CM

## 2025-05-16 DIAGNOSIS — D22.9 MULTIPLE NEVI: ICD-10-CM

## 2025-05-16 DIAGNOSIS — L81.4 LENTIGO: ICD-10-CM

## 2025-05-16 DIAGNOSIS — H61.001 CNH (CHONDRODERMATITIS NODULARIS HELICIS), RIGHT: Primary | ICD-10-CM

## 2025-05-16 DIAGNOSIS — L82.1 SEBORRHEIC KERATOSES: ICD-10-CM

## 2025-05-16 PROCEDURE — 99213 OFFICE O/P EST LOW 20 MIN: CPT | Performed by: DERMATOLOGY

## 2025-05-16 PROCEDURE — G2211 COMPLEX E/M VISIT ADD ON: HCPCS | Performed by: DERMATOLOGY

## 2025-05-16 RX ORDER — MUPIROCIN 20 MG/G
OINTMENT TOPICAL
Qty: 22 G | Refills: 3 | Status: SHIPPED | OUTPATIENT
Start: 2025-05-16

## 2025-05-16 NOTE — PROGRESS NOTES
The following individual(s) verbally consented to be recorded using ambient AI listening technology and understand that they can each withdraw their consent to this listening technology at any point by asking the clinician to turn off or pause the recording:    Patient name: Leslee MANDY Alonso  Additional names:  Sina Alonso ()
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
## 2025-06-04 ENCOUNTER — TELEPHONE (OUTPATIENT)
Dept: DERMATOLOGY CLINIC | Facility: CLINIC | Age: 70
End: 2025-06-04

## 2025-06-04 RX ORDER — CEPHALEXIN 500 MG/1
500 CAPSULE ORAL 2 TIMES DAILY
Qty: 14 CAPSULE | Refills: 0 | Status: SHIPPED | OUTPATIENT
Start: 2025-06-04
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
## 2025-06-24 ENCOUNTER — OFFICE VISIT (OUTPATIENT)
Dept: OTOLARYNGOLOGY | Facility: CLINIC | Age: 70
End: 2025-06-24
Payer: MEDICARE

## 2025-06-24 ENCOUNTER — TELEPHONE (OUTPATIENT)
Dept: DERMATOLOGY CLINIC | Facility: CLINIC | Age: 70
End: 2025-06-24

## 2025-06-24 VITALS — BODY MASS INDEX: 29 KG/M2 | WEIGHT: 165 LBS

## 2025-06-24 DIAGNOSIS — R07.0 THROAT PAIN IN ADULT: Primary | ICD-10-CM

## 2025-06-24 DIAGNOSIS — R09.82 POSTNASAL DISCHARGE: ICD-10-CM

## 2025-06-24 PROCEDURE — 99213 OFFICE O/P EST LOW 20 MIN: CPT | Performed by: OTOLARYNGOLOGY

## 2025-06-24 RX ORDER — GLYCOPYRROLATE 1 MG/1
1 TABLET ORAL 3 TIMES DAILY
Qty: 90 TABLET | Refills: 1 | Status: SHIPPED | OUTPATIENT
Start: 2025-06-24

## 2025-06-24 RX ORDER — TRIAMCINOLONE ACETONIDE 1 MG/G
CREAM TOPICAL 2 TIMES DAILY
COMMUNITY
Start: 2025-06-09 | End: 2025-08-08

## 2025-06-24 RX ORDER — IPRATROPIUM BROMIDE 21 UG/1
1 SPRAY, METERED NASAL 3 TIMES DAILY
Qty: 1 EACH | Refills: 3 | Status: SHIPPED | OUTPATIENT
Start: 2025-06-24
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
## 2025-06-24 NOTE — PROGRESS NOTES
Leslee Alonso is a 69 year old female.    Chief Complaint   Patient presents with    Throat Problem     Patient is here for swallowing problems reports something stuck on her throat  reports right ear concerns        HISTORY OF PRESENT ILLNESS  She presents today with a chronic cough as well as sensation of tightening in her throat that is been going on for at least 4 months. Feels that food passes but feels like it does not pass without some tightness. Does not have to regurgitate food. She does have a chronic cough which is nonproductive. Some nasal congestion. Specifically denies any reflux issues. Currently on omeprazole 20 mg p.o. twice daily and denies any reflux issues at this time.      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  She was started on Singulair Loratadine-D Astelin nasal spray and omeprazole 20 mg increased to twice daily.  Complete resolution of her chronic cough but still continues to have sensation of tightness and phlegm in her throat.  Stopped the medications about 8 days ago when the first month the meds was completed has not gotten refills.  No other signs, symptoms or complaints.     10/24/24 I last saw her in June of this year and started her on Claritin-D Singulair and Astelin.  She has been using omeprazole twice daily for GERD and feels that her GERD symptoms are reasonably well-controlled.  She is planning on having an EGD in the near future.  Will be following up with a GI doctor at some point.  States that she feels a choking sensation especially when she lays back to go to sleep feels like there is a scarf wrapped around her neck and being pulled backwards.  No voice changes some cough some throat clearing.  Denies any significant postnasal discharge.  Currently no longer using her medications.  Use them for about 30 to 45 days did not really note any improvement in her symptoms of chronic cough previously.  Cough resolved on its own by  sometime in July or August.  States that she did well up until late September when she started noticing the sensation of fullness and tightness and pressure in her neck and throat.  Previously noted to have a small cyst on the area epiglottic fold on the left which was nonobstructing and did not impinge upon the airway or the introitus of the esophagus.  Able to swallow able to eat without difficulty no weight loss.    6/24/25 she presents with continued problems with phlegm in the back of her throat.  Seen by gastroenterologist had an EGD performed since I last saw her demonstrating gastric polyps esophageal polyps as well as hiatal hernia.  Diagnosed with GERD currently on omeprazole 40 daily.  Here for further evaluation stating Singulair and Zyrtec but no longer using Sudafed or nasal sprays.    Social Hx on file[1]    Family History[2]    Past Medical History[3]    Past Surgical History[4]      REVIEW OF SYSTEMS    System Neg/Pos Details   Constitutional Negative Fatigue, fever and weight loss.   ENMT Negative Drooling.   Eyes Negative Blurred vision and vision changes.   Respiratory Negative Dyspnea and wheezing.   Cardio Negative Chest pain, irregular heartbeat/palpitations and syncope.   GI Negative Abdominal pain and diarrhea.   Endocrine Negative Cold intolerance and heat intolerance.   Neuro Negative Tremors.   Psych Negative Anxiety and depression.   Integumentary Negative Frequent skin infections, pigment change and rash.   Hema/Lymph Negative Easy bleeding and easy bruising.           PHYSICAL EXAM    Wt 165 lb (74.8 kg)   BMI 29.23 kg/m²        Constitutional Normal Overall appearance - Normal.   Psychiatric Normal Orientation - Oriented to time, place, person & situation. Appropriate mood and affect.   Neck Exam Normal Inspection - Normal. Palpation - Normal. Parotid gland - Normal. Thyroid gland - Normal.   Eyes Normal Conjunctiva - Right: Normal, Left: Normal. Pupil - Right: Normal, Left: Normal.  Fundus - Right: Normal, Left: Normal.   Neurological Normal Memory - Normal. Cranial nerves - Cranial nerves II through XII grossly intact.   Head/Face Normal Facial features - Normal. Eyebrows - Normal. Skull - Normal.        Nasopharynx Normal External nose - Normal. Lips/teeth/gums - Normal. Tonsils - Normal. Oropharynx - Normal.   Ears Normal Inspection - Right: Normal, Left: Normal. Canal - Right: Normal, Left: Normal. TM - Right: Normal, Left: Normal.   Skin Normal Inspection - Normal.        Lymph Detail Normal Submental. Submandibular. Anterior cervical. Posterior cervical. Supraclavicular.        Nose/Mouth/Throat Normal External nose - Normal. Lips/teeth/gums - Normal. Tonsils - Normal. Oropharynx -postnasal discharge   Nose/Mouth/Throat Normal Nares - Right: Normal Left: Normal. Septum -Normal  Turbinates - Right: Normal, Left: Normal.     Medications - Current[5]  ASSESSMENT AND PLAN    1. Throat pain in adult    2. Postnasal discharge  Continued postnasal discharge.  Continue the Zyrtec montelukast I will start her on Astelin and glycopyrrolate.  Return to see me in 1 month for reevaluation.  Repeat laryngoscopy in October which will be 1 year from her previous laryngoscopy to reevaluate the cyst on her epiglottis.        This note was prepared using Dragon [employer] voice recognition dictation software. As a result errors may occur. When identified these errors have been corrected. While every attempt is made to correct errors during dictation discrepancies may still exist    Sukhi Morris MD    6/24/2025    10:30 AM         [1]   Social History  Socioeconomic History    Marital status:    Tobacco Use    Smoking status: Never    Smokeless tobacco: Never   Vaping Use    Vaping status: Never Used   Substance and Sexual Activity    Alcohol use: Yes     Comment: Wine, 2 drinks occasionally    Drug use: Never   Other Topics Concern    Grew up on a farm No    History of tanning Yes    Outdoor  occupation No    Pt has a pacemaker No    Pt has a defibrillator No    Breast feeding No    Reaction to local anesthetic No    Caffeine Concern Yes     Comment: Coffee, soda, 2 cups daily   [2]   Family History  Problem Relation Age of Onset    Cancer Mother         Cancer-skin    Basal Cell Mother         skin cancer    Other (Other) Mother         Hearing loss/Pulmonary hypertension    Heart Disorder Brother     Cancer Brother         Cancer-bladder    Cancer Father         Cancer-throat    Dementia Maternal Grandmother         Alzheimer's     Stroke Maternal Grandfather     Cataracts Other     Dementia Maternal Aunt         alzheimer's    Heart Disease Brother     Heart Disease Paternal Aunt    [3]   Past Medical History:   Atypical lobular hyperplasia of left breast    Esophageal reflux    Fibromyalgia    H/O measles    H/O mumps    History of chicken pox    IBS (irritable bowel syndrome)    Osteoporosis    Sinus disorder    Sinus problems   [4]   Past Surgical History:  Procedure Laterality Date      , 3/96    Tubal ligation     [5]   Current Outpatient Medications:     triamcinolone 0.1 % External Cream, Apply topically 2 (two) times daily., Disp: , Rfl:     cephALEXin 500 MG Oral Cap, Take 1 capsule (500 mg total) by mouth 2 (two) times daily., Disp: 14 capsule, Rfl: 0    mupirocin 2 % External Ointment, Use as directed bid to affected area, Disp: 22 g, Rfl: 3    Omeprazole 40 MG Oral Capsule Delayed Release, Take 1 capsule (40 mg total) by mouth daily., Disp: , Rfl:     cetirizine 5 MG Oral Tab, Take 1 tablet (5 mg total) by mouth daily., Disp: , Rfl:     EPINEPHrine (EPIPEN 2-ELIZABETH) 0.3 MG/0.3ML Injection Solution Auto-injector, Inject IM in event of  allergic reaction, Disp: 1 each, Rfl: 0    Cholecalciferol (VITAMIN D3) 1000 units Oral Cap, Take 1 tablet by mouth daily., Disp: , Rfl:     Vitamin B-12 100 MCG Oral Tab, Take 2.5 tablets (250 mcg total) by mouth daily., Disp: , Rfl:      ipratropium 0.03 % Nasal Solution, 1 spray by Nasal route 3 (three) times daily., Disp: 1 each, Rfl: 3    glycopyrrolate 1 MG Oral Tab, Take 1 tablet (1 mg total) by mouth 3 (three) times daily., Disp: 90 tablet, Rfl: 1    Calcium Carbonate 600 MG Oral Tab, Take  by mouth. (Patient not taking: Reported on 6/24/2025), Disp: , Rfl:

## 2025-07-30 ENCOUNTER — TELEPHONE (OUTPATIENT)
Dept: DERMATOLOGY CLINIC | Facility: CLINIC | Age: 70
End: 2025-07-30

## 2025-07-30 RX ORDER — CEPHALEXIN 500 MG/1
500 CAPSULE ORAL 2 TIMES DAILY
Qty: 14 CAPSULE | Refills: 0 | Status: SHIPPED | OUTPATIENT
Start: 2025-07-30

## (undated) NOTE — MR AVS SNAPSHOT
After Visit Summary   2/9/2024    Leslee Alonso   MRN: [id number]           Visit Information     Date & Time  2/9/2024  9:20 AM Provider  Fortunato Crowell MD [facility] - OB/GYN Dept. Phone  [phone]      Your Vitals Were  Most recent update: 2/9/2024  9:19 AM    BP   136/79    Ht   63\"    Wt   163 lb 6.4 oz    BMI   28.95 kg/m²          Allergies as of 2/9/2024  Review status set to Review Complete on 2/9/2024       Noted Reaction Type Reactions    Bee Venom 08/24/2004    ANAPHYLAXIS    Sulfa Antibiotics 08/11/2003    HIVES, SHORTNESS OF BREATH, SWELLING    swelling    Sulfanilamide 08/30/2014    SHORTNESS OF BREATH      Your Current Medications        Dosage    cetirizine 5 MG Oral Tab Take 1 tablet (5 mg total) by mouth daily.    Hyoscyamine Sulfate SL 0.125 MG Sublingual SL Tab Place 1 tablet under the tongue every 4 (four) hours as needed.    Cholecalciferol (VITAMIN D3) 1000 units Oral Cap Take 1 tablet by mouth daily.    Vitamin B-12 100 MCG Oral Tab Take 2.5 tablets (250 mcg total) by mouth daily.    Calcium Carbonate 600 MG Oral Tab Take  by mouth.    cephalexin 500 MG Oral Cap Take 1 capsule (500 mg total) by mouth every 8 (eight) hours.    metRONIDAZOLE 0.75 % External Cream Use bid to affected areas of face    triamcinolone 0.1 % External Ointment Applied 2 times per day as needed    EPINEPHrine (EPIPEN 2-ELIZABETH) 0.3 MG/0.3ML Injection Solution Auto-injector Inject IM in event of  allergic reaction      Diagnoses for This Visit    Encounter for gynecological examination without abnormal finding   [[id number]]  -  Primary  Encounter for Papanicolaou smear for cervical cancer screening   [[id number]]    Atypical lobular hyperplasia (ALH) of left breast   [5280647]    Skin rash   [[id number]]             We Ordered the Following     Normal Orders This Visit    Hpv Dna  High Risk , Thin Prep Collect [[id number] CUSTOM]     THINPREP PAP  SMEAR ONLY [[id number] CUSTOM]     ThinPrep PAP Smear [[id number] CUSTOM]     Future Labs/Procedures Expected by Expires    Hpv Dna  High Risk , Thin Prep Collect [[id number] CUSTOM]  2/9/2024 2/9/2025    ThinPrep PAP Smear [[id number] CUSTOM]  2/9/2024 2/9/2025      Future Appointments        Provider Department    7/17/2024 10:15 AM Sujey Bailey [facility]                Did you know that [facility] primary care physicians now offer Video Visits through [employer] for adult patients for a variety of conditions such as allergies, back pain and cold symptoms? Skip the drive and waiting room and online chat with a doctor face-to-face using your web-cam enabled computer or mobile device wherever you are. Video Visits cost $50 and can be paid hassle-free using a credit, debit, or health savings card.  Not active on [employer]? Ask us how to get signed up today!          If you receive a survey from [employer], please take a few minutes to complete it and provide feedback. We strive to deliver the best patient experience and are looking for ways to make improvements. Your feedback will help us do so. For more information on [employer], please visit [email or web address]           No text in SmartText           No text in SmartText

## (undated) NOTE — LETTER
AUTHORIZATION FOR SURGICAL OPERATION OR OTHER PROCEDURE    1. I hereby authorize Dr. Monika Davidson, and [facility] staff assigned to my case to perform the following operation and/or procedure at the [facility]:    _______________________________________________________________________________________________    Repair of right ear lobe   _______________________________________________________________________________________________    2. My physician has explained the nature and purpose of the operation or other procedure, possible alternative methods of treatment, the risks involved, and the possibility of complication to me. I acknowledge that no guarantee has been made as to the result that may be obtained. 3.  I recognize that, during the course of this operation, or other procedure, unforseen conditions may necessitate additional or different procedure than those listed above. I, therefore, further authorize and request that the above named physician, his/her physician assistants or designees perform such procedures as are, in his/her professional opinion, necessary and desirable. 4.  Any tissue or organs removed in the operation or other procedure may be disposed of by and at the discretion of the [facility] and [facility] AT [facility]. 5.  I understand that in the event of a medical emergency, I will be transported by local paramedics to [facility] or other [facility] emergency department. 6.  I certify that I have read and fully understand the above consent to operation and/or other procedure. 7.  I acknowledge that my physician has explained sedation/analgesia administration to me including the risks and benefits. I consent to the administration of sedation/analgesia as may be necessary or desirable in the judgement of my physician.     Witness signature: ___________________________________________________ Date:  ______/______/_____                    Time: ________ A. M.  P.M. Patient Name:  ______________________________________________________  (please print)      Patient signature:  ___________________________________________________             Relationship to Patient:           []  Parent    Responsible person                          []  Spouse  In case of minor or                    [] Other  _____________   Incompetent name:  __________________________________________________                               (please print)      _____________      Responsible person  In case of minor or  Incompetent signature:  _______________________________________________    Statement of Physician  My signature below affirms that prior to the time of the procedure, I have explained to the patient and/or his/her guardian, the risks and benefits involved in the proposed treatment and any reasonable alternative to the proposed treatment. I have also explained the risks and benefits involved in the refusal of the proposed treatment and have answered the patient's questions.                         Date:  ______/______/_______  Provider                      Signature:  __________________________________________________________       Time:  ___________ A.M    P.M.